# Patient Record
Sex: FEMALE | Race: OTHER | Employment: UNEMPLOYED | URBAN - METROPOLITAN AREA
[De-identification: names, ages, dates, MRNs, and addresses within clinical notes are randomized per-mention and may not be internally consistent; named-entity substitution may affect disease eponyms.]

---

## 2020-01-06 LAB — HBSAG, EXTERNAL: NEGATIVE

## 2020-12-23 ENCOUNTER — VIRTUAL VISIT (OUTPATIENT)
Dept: FAMILY MEDICINE CLINIC | Age: 35
End: 2020-12-23
Payer: SUBSIDIZED

## 2020-12-23 DIAGNOSIS — N92.6 MISSED MENSES: Primary | ICD-10-CM

## 2020-12-23 PROCEDURE — 99443 PR PHYS/QHP TELEPHONE EVALUATION 21-30 MIN: CPT | Performed by: STUDENT IN AN ORGANIZED HEALTH CARE EDUCATION/TRAINING PROGRAM

## 2020-12-23 NOTE — PROGRESS NOTES
Nate Delacruz  38 y.o. female  1985  [address] [place] [address], [address]  [id number]    [phone] (home)      [address]:    Telephone Encounter  Alfie Qu[place]       Encounter Date: 12/23/2020 at 9:33 AM    Consent: Nate Delacruz, who was seen by synchronous (real-time) audio only technology, and/or her healthcare decision maker, is aware that this patient-initiated, Telehealth encounter on 12/23/2020 is a billable service, with coverage as determined by her insurance carrier. She is aware that she may receive a bill and has provided verbal consent to proceed: Yes. Chief Complaint   Patient presents with    Missed Menses     home preg test positive       History of Present Illness   Nate Delacruz is a 38 y.o. female was evaluated by telephone. I communicated with the patient and/or health care decision maker about Missed Menses. Interpretor #[id number] used     Pt is [id number] with LMP approximately 7/15/2020 making her 23w0d with LAURE 4/21/2021. No prenatal care. States she took a home pregnancy test in August 2020 that was positive. She has been taking prenatal vitamins. States she has not been able to schedule an appointment due to insurance reasons and did not find out about our clinic until 1 month ago. Of note: Pt states that prior to knowing she was pregnant 1 month ago she was taking an over the counter dietary supplement to loose weight. She lost about 30lb but stopped the supplement as soon as she found out she was pregnant. She does not know what her pre- pregnancy weight was and does not know how much she weighs right now. LOF: no   Vaginal bleeding: no   Contractions: no   Fetal movement: yes     Pap smear: last pap 2017- + h/o abnormal pap smear  STD: never  Post partum depression: no   Genetic disorder: no  GDM history: no   HTN history or PreE: no   Thyroid history: no     COVID screening: NEGATIVE. Pt denies sick contacts or recent travel. Denies recent illness, fever, chills, sore throat, vision changes, HA, dizziness, cough , SOB, chest pain, dysuria, n/v/d, abdominal pain or extremity edema. Review of Systems   Review of Systems   Constitutional: Negative for chills and fever. HENT: Negative for congestion and sinus pain. Eyes: Negative for double vision. Respiratory: Negative for cough and shortness of breath. Cardiovascular: Negative for chest pain and palpitations. Gastrointestinal: Negative for abdominal pain, nausea and vomiting. Genitourinary: Negative for dysuria and hematuria. Musculoskeletal: Negative for myalgias. Skin: Negative for rash. Neurological: Negative for dizziness and headaches. Vitals/Objective:   General: Patient speaking in complete sentences without effort. Normal speech and cooperative. Due to this being a Virtual Check-in/Telephone evaluation, many elements of the physical examination are unable to be assessed. Assessment and Plan:   Time-based coding, delete if not needed: I spent at least 20 minutes with this new patient, and >50% of the time was spent counseling and/or coordinating care regarding Missed Menses   Total Time: minutes: 21-30 minutes    1. Missed menses  -LMP 7/15/20- [id number] at 23w0d based on LMP. -late to care: needs UDS and IOB labs next week. (if 24wk at this time needs GTT)     -h/o Abnormal pap: needs repeat pap   -AMA: requests genetic testing. No h/o PreE that I am aware of. No weigt on file to determine BMI but if Pt is determined obese at [address] would start ASA  -needs anatomy scan with MFM arranged     We discussed the expected course, resolution and complications of the diagnosis(es) in detail. Medication risks, benefits, costs, interactions, and alternatives were discussed as indicated. I advised her to contact the office if her condition worsens, changes or fails to improve as anticipated. She expressed understanding with the diagnosis(es) and plan. Patient understands that this encounter was a temporary measure, and the importance of further follow up and examination was emphasized. Patient verbalized understanding. Patient informed to follow up: 1 week for IOB visit. (message sent to nurse to schedule)     I affirm this is a Patient Initiated Episode with an Established Patient who has not had a related appointment within my department in the past 7 days or scheduled within the next 24 hours. Note: not billable if this call serves to triage the patient into an appointment for the relevant concern      Electronically Signed: Saroj Webb DO  Providers location when delivering service: home           ICD-10-CM ICD-9-CM    1. Missed menses  N92.6 626.4        Pursuant to the emergency declaration under the [address] [place] [address], [facility] waiver authority and the [employer] Act, this Virtual  Visit was conducted, with patient's consent, to reduce the patient's risk of exposure to COVID-19 and provide continuity of care for an established patient. History   Patients past medical, surgical and family histories were personally reviewed and updated. Past Medical History:   Diagnosis Date    History of abnormal cervical Pap smear 2017     History reviewed. No pertinent surgical history. No family history on file.   Social History     Socioeconomic History    Marital status: SINGLE     Spouse name: Not on file    Number of children: Not on file    Years of education: Not on file    Highest education level: Not on file   Occupational History    Not on file   Social Needs    Financial resource strain: Not on file    Food insecurity     Worry: Not on file     Inability: Not on file    Transportation needs     Medical: Not on file     Non-medical: Not on file   Tobacco Use    Smoking status: Never Smoker    Smokeless tobacco: Never Used   Substance and Sexual Activity    Alcohol use: Never     Frequency: Never     Binge frequency: Never    Drug use: Never    Sexual activity: Not on file   Lifestyle    Physical activity     Days per week: Not on file     Minutes per session: Not on file    Stress: Not on file   Relationships    Social connections     Talks on phone: Not on file     Gets together: Not on file     Attends Buddhism service: Not on file     Active member of club or organization: Not on file     Attends meetings of clubs or organizations: Not on file     Relationship status: Not on file    Intimate partner violence     Fear of current or ex partner: Not on file     Emotionally abused: Not on file     Physically abused: Not on file     Forced sexual activity: Not on file   Other Topics Concern    Not on file   Social History Narrative    Patient used to work in a [employer] [employer] [facility] [employer]             Current Medications/Allergies   Medications and Allergies reviewed:      Not on [employer]

## 2020-12-23 NOTE — PROGRESS NOTES
[address] Medicine Residency Attending Addendum:  Dr. John Tejeda, DO,  the patient and I were not physically present during this encounter. The resident and I are concurrently monitoring the patient care through appropriate telecommunication technology. I discussed the findings, assessment and plan with the resident and agree with the resident's findings and plan as documented in the resident's note.       Sidonie Goodell, MD

## 2020-12-23 NOTE — PATIENT INSTRUCTIONS
Semanas 22 a 26 de lara embarazo: Instrucciones de cuidado Weeks 22 to 26 of Your Pregnancy: Care Instructions Instrucciones de cuidado Al comenzar el 7.º mes de lara embarazo en la semana 32, los pulmones de lara bebé se están volviendo más daniel y se están preparando para respirar. Podría notar que lara bebé responde al bernardo de lara voz o la de lara roxanna. También podría notar que lara bebé se voltea y [place] menos de posición, y se retuerce o sacude más. Por lo general, las sacudidas indican que lara bebé tiene hipo. Tener hipo es totalmente normal y dura poco tiempo. Vikas vez sea buena idea pensar en asistir a debby clase de preparación para el parto. Moshe es también un buen momento para comenzar a pensar si desea que stephie el parto le administren un analgésico (medicamento para el dolor). A la mayoría de las mujeres embarazadas les hacen pruebas para la diabetes gestacional entre las semanas 24 y 29. La diabetes gestacional ocurre cuando el nivel de azúcar en la maria dolores es muy alto stephie el [facility]. La prueba es importante, porque usted puede tener diabetes gestacional y no saberlo. Amna esta enfermedad puede causarle problemas a lara bebé. La atención de seguimiento es debby parte clave de lara tratamiento y seguridad. Asegúrese de hacer y acudir a todas las citas, y llame a lara médico si está teniendo problemas. También es debby buena idea saber los resultados de madiha exámenes y mantener debby lista de los medicamentos que ady. Cómo puede cuidarse en el hogar? Alivie las molestias de las patadas de lara bebé · Cambie de posición. A veces, moshe cambio hace que lara bebé también cambie de posición. · Respire hondo al mismo tiempo que levanta los brazos sobre lara [place]. Después exhale a medida que baja los brazos. Amilcar los ejercicios de [place] para evitar pérdidas de [facility] · Puede hacer los ejercicios de [place] estando felix o sentada. ? Apriete los mismos músculos que usaría para detener el chorro de [place]. El abdomen y los muslos no deben moverse. ? Manténgalos apretados stephie 3 segundos y, luego, relájelos otros 3 segundos. ? Empiece con 3 segundos. Ashlyn Peat, añada 1 brenda cada semana hasta que sea capaz de apretar stephie 10 segundos. ? Repita el ejercicio entre 10 y 13 veces [address] Amilcar jarrell o más sesiones cada día. Alivie o reduzca la hinchazón de los pies, los tobillos, las kris y los dedos · Si tiene los dedos hinchados, quítese los [place]. · No coma alimentos con mucha sal, bladimir dennis fritas. · Coloque madiha pies sobre un banco o un sofá todo el tiempo que le sea posible. Duerma con almohadas debajo de los pies. · No se quede de pie stephie mucho tiempo ni use calzado ajustado. · Use medias elásticas de soporte. Dónde puede encontrar más información en inglés? Skye How a http://www.james.com/ Escriba G264 en la búsqueda para aprender más acerca de \"Semanas 22 a 26 de lara embarazo: Instrucciones de cuidado. \" Revisado: 11 febrero, 2020               Versión del contenido: 12.6 © 9944-1530 Healthwise, Incorporated. Las instrucciones de cuidado fueron adaptadas bajo licencia por Good [facility] Connections (which disclaims liability or warranty for this information). Si usted tiene [place] afección médica o sobre estas instrucciones, siempre pregunte a lara profesional de kiran. Healthwise, Incorporated niega toda garantía o responsabilidad por lara uso de esta información. Precauciones en el embarazo: Instrucciones de cuidado Pregnancy Precautions: Care Instructions Instrucciones de cuidado No hay debby manera samaniego de prevenir el trabajo de parto antes de la fecha esperada (trabajo de parto prematuro) o de prevenir la mayoría de otros problemas en el [facility]. Amna hay cosas que puede hacer para aumentar las probabilidades de tener un embarazo saludable. Vaya a madiha citas, siga los consejos de lara médico y cuídese. Coma fidelina y marlene ejercicio (si lara médico lo permite). Y asegúrese de tree abundante agua. La atención de seguimiento es debby parte clave de lara tratamiento y seguridad. Asegúrese de hacer y acudir a todas las citas, y llame a lara médico si está teniendo problemas. También es debby buena idea saber los resultados de madiha exámenes y mantener debby lista de los medicamentos que ady. Cómo puede cuidarse en el hogar? · Asegúrese de asistir a las citas prenatales. Lara médico le tomará la presión arterial en cada consulta. Lara médico también comprobará si tiene proteínas en lara orina. Tanto la presión arterial tigre bladimir la presencia de proteínas en la orina son señales de preeclampsia. Esta afección puede ser peligrosa tanto para usted bladimir para lara bebé. · [place] abundantes líquidos, suficientes para que lara orina sea de color amarillo adelina o transparente bladimir el agua. La deshidratación puede causar contracciones. Si tiene [employer], el corazón o el hígado y tiene que [place]'s líquidos, hable con lara médico antes de aumentar lara consumo. · Notifique a lara médico de inmediato si presenta cualquier síntoma de infección, tales bladimir: ? Ardor cuando orina. ? Flujo con mal olor de la vagina. ? Comezón en la vagina. ? Erica Lynne sin explicación. ? Dolor o sensibilidad inusual en el útero o la parte baja del abdomen. · Aliméntese en forma equilibrada. Incluya muchos alimentos que tien ricos en calcio y ming. ? Entre los alimentos ricos en calcio se incluyen la [place], el queso, el yogur, [place] y el brócoli. ? Entre los alimentos ricos en ming se incluyen las yue [place], los [place], las aves, los [place], los frijoles, las uvas pasas, el pan de grano integral y las verduras de hojas verdes. · No fume. Si necesita ayuda para dejar de fumar, hable con lara médico sobre programas y medicamentos para dejar de fumar. Estos pueden aumentar madiha probabilidades de dejar el hábito para siempre. · No ju alcohol ni use drogas ilegales. · Siga las instrucciones de lara médico acerca de la [place]. Lara médico le dirá cuánto ejercicio puede hacer. · Pregúntele a lara médico si puede tener [employer]. Si usted está en riesgo de tener trabajo de [place], lara médico podría pedirle que no tenga relaciones sexuales. · [place] precauciones para prevenir las caídas. Benjamin el embarazo las articulaciones están más sueltas y se tiene menos equilibrio. Los deportes tales bladimir el ciclismo, el esquí o el patinaje en línea pueden aumentar el riesgo de caídas. Y no monte a lizbeth, sandy en motocicleta, marlene clavados, marlene esquí acuático, bucee, ni salte en paracaídas mientras está embarazada. · Evite calentarse demasiado. No use saunas ni bañeras de hidromasaje. Evite la exposición al sol en climas calientes por mucho tiempo. [place] acetaminofén (Tylenol) para bajar debby fiebre tigre. · No tome medicamentos de venta leyla, productos herbarios ni suplementos sin hablar oh con lara médico o farmacéutico. 

Cuándo debe pedir ayuda? Llame al 911 en cualquier momento que considere que necesita atención de [place]. Por ejemplo, llame si: 
  · Se desmayó (perdió el conocimiento).  
  · Tiene convulsiones.  
  · Tiene sangrado vaginal intenso.  
  · Tiene dolor intenso en el abdomen o la pelvis.   · Le sale abundante líquido o gotea de la vagina y sabe o nila que el cordón umbilical se está saliendo a lara vagina. Si esto sucede, arrodíllese de inmediato, de oswald forma que madiha nalgas estén más altas que lara jasmin. Index disminuirá la presión sobre el cordón umbilical hasta que llegue la [facility]. Llame a lara médico ahora mismo o busque atención médica inmediata si: 
  · Tiene señales de preeclampsia, bladimir: 
? Hinchazón repentina de la d[place]on, las kris o los pies. ? Nuevos problemas de visión (bladimir oscurecimiento, bartolo borroso o bartolo puntos). ? Dolor de jasmin intenso.  
  · Tiene cualquier sangrado vaginal.  
  · Tiene dolor o cólicos abdominales.  
  · Tiene fiebre.  
  · Charolett Yanaiel tenido contracciones regulares (con o sin dolor) por [place]. Index significa que tiene 8 o más contracciones en 1 hora o que tiene 4 contracciones o más en 20 minutos después de [place] de posición y tree líquidos.  
  · Tiene debby pérdida repentina de líquido por la vagina.  
  · Tiene dolor en la parte baja de la espalda o presión en la pelvis que no desaparece.  
  · Nota que lara bebé ha dejado de moverse o se mueve mucho menos de lo normal.  
Preste especial atención a los cambios en lara kiran y asegúrese de comunicarse con lara médico si tiene algún problema. Dónde puede encontrar más información en inglés? Effie Schmid a http://www.erika.com/ Jennyfer [place]una N392 en la búsqueda para aprender más acerca de \"Precauciones en el embarazo: Instrucciones de cuidado. \" Revisado: 11 febrero, 2020               Versión del contenido: 12.6 © 5559-5335 Healthwise, Incorporated. Las instrucciones de cuidado fueron adaptadas bajo licencia por Good [facility] Connections (which disclaims liability or warranty for this information). Si usted tiene [place] afección médica o sobre estas instrucciones, siempre pregunte a lara profesional de kiran. [facility] Incorporated niega toda garantía o responsabilidad por lara uso de esta información.

## 2020-12-23 NOTE — Clinical Note
Jam Hernández,     This patient neds a new OB appt in clinic next week as she is late to care at 23w0d based on LMP 7/15/20.

## 2021-01-06 ENCOUNTER — INITIAL PRENATAL (OUTPATIENT)
Dept: FAMILY MEDICINE CLINIC | Age: 36
End: 2021-01-06
Payer: SUBSIDIZED

## 2021-01-06 ENCOUNTER — HOSPITAL ENCOUNTER (OUTPATIENT)
Dept: LAB | Age: 36
Discharge: HOME | End: 2021-01-06
Payer: SUBSIDIZED

## 2021-01-06 VITALS
WEIGHT: 215 LBS | HEART RATE: 101 BPM | OXYGEN SATURATION: 98 % | TEMPERATURE: 97 F | SYSTOLIC BLOOD PRESSURE: 105 MMHG | RESPIRATION RATE: 16 BRPM | HEIGHT: 62 IN | DIASTOLIC BLOOD PRESSURE: 72 MMHG | BODY MASS INDEX: 39.56 KG/M2

## 2021-01-06 DIAGNOSIS — O09.529 ANTEPARTUM MULTIGRAVIDA OF ADVANCED MATERNAL AGE: ICD-10-CM

## 2021-01-06 DIAGNOSIS — Z34.91 INITIAL OBSTETRIC VISIT IN FIRST TRIMESTER: ICD-10-CM

## 2021-01-06 DIAGNOSIS — Z34.91 INITIAL OBSTETRIC VISIT IN FIRST TRIMESTER: Primary | ICD-10-CM

## 2021-01-06 DIAGNOSIS — E66.9 OBESITY WITHOUT SERIOUS COMORBIDITY, UNSPECIFIED CLASSIFICATION, UNSPECIFIED OBESITY TYPE: ICD-10-CM

## 2021-01-06 LAB
ANTIBODY SCREEN, EXTERNAL: NEGATIVE
BILIRUB UR QL STRIP: NEGATIVE
CHLAMYDIA, EXTERNAL: NEGATIVE
GLUCOSE UR-MCNC: NEGATIVE MG/DL
HCG URINE, QL. (POC): POSITIVE
HIV, EXTERNAL: NORMAL
KETONES P FAST UR STRIP-MCNC: NEGATIVE MG/DL
N. GONORRHEA, EXTERNAL: NEGATIVE
PH UR STRIP: 5.5 [PH] (ref 4.6–8)
PROT UR QL STRIP: NORMAL
RPR, EXTERNAL: NORMAL
RUBELLA, EXTERNAL: NORMAL
SP GR UR STRIP: 1.03 (ref 1–1.03)
TYPE, ABO & RH, EXTERNAL: NORMAL
UA UROBILINOGEN AMB POC: NORMAL (ref 0.2–1)
URINALYSIS CLARITY POC: NORMAL
URINALYSIS COLOR POC: NORMAL
URINALYSIS, EXTERNAL: NORMAL
URINE BLOOD POC: NORMAL
URINE LEUKOCYTES POC: NORMAL
URINE NITRITES POC: NEGATIVE
VALID INTERNAL CONTROL?: YES

## 2021-01-06 PROCEDURE — 88175 CYTOPATH C/V AUTO FLUID REDO: CPT

## 2021-01-06 PROCEDURE — 0502F SUBSEQUENT PRENATAL CARE: CPT | Performed by: STUDENT IN AN ORGANIZED HEALTH CARE EDUCATION/TRAINING PROGRAM

## 2021-01-06 PROCEDURE — 87591 N.GONORRHOEAE DNA AMP PROB: CPT

## 2021-01-06 RX ORDER — ASPIRIN 81 MG/1
81 TABLET ORAL DAILY
Qty: 30 TAB | Refills: 0 | Status: SHIPPED | OUTPATIENT
Start: 2021-01-06 | End: 2021-01-30

## 2021-01-06 NOTE — PATIENT INSTRUCTIONS
[facility] Learning About Pregnancy Instrucciones de cuidado [place] kiran stephie las primeras semanas del [facility] es de particular importancia para la kiran de burks bebé. Cuídese. Cualquier cosa que perjudique burks organismo puede perjudicar a burks bebé. Asegúrese de asistir a todas madiha citas médicas. Los chequeos médicos regulares les ayudarán a usted y a burks bebé a mantenerse saludables. La atención de seguimiento es debby parte clave de burks tratamiento y seguridad. Asegúrese de hacer y acudir a todas las citas, y llame a bursk médico si está teniendo problemas. También es debby buena idea saber los resultados de madiha exámenes y mantener debby lista de los medicamentos que ady. Cómo puede cuidarse en el hogar? Alimentación 
  · Siga debby [employer]. Asegúrese de incluir en burks dieta abundantes frijoles (habichuelas), arvejas (chícharos) y verduras de hojas verdes.  
  · No se salte las comidas ni pase muchas horas sin comer. Si tiene náuseas, trate de comer un refrigerio pequeño y saludable cada 2 o 3 horas.  
  · No consuma pescados que tengan [address] de chris, bladimir tiburón, pez jasbir o caballa. No coma más de debby karin de atún a la semana.  
  · Ju abundantes líquidos, suficientes para que burks orina sea de color amarillo adelina o transparente bladimir el agua. Si tiene [employer], el corazón o el hígado y tiene que [place]'s líquidos, hable con burks médico antes de aumentar burks consumo.  
  · Reduzca la cafeína, bladimir el café, el té y las bebidas de cola.  
  · No ju alcohol, bladimir cerveza, vino o licor rick.  
  · [place] un multivitamínico que contenga al menos 400 microgramos (mcg) de ácido fólico para ayudar a prevenir las anomalías congénitas (de nacimiento).  El cereal enriquecido y el astorga integral son buenas simon adicionales de ácido fólico.  
   · Aumente el calcio en lara Lyndle Nura. Trate de beber un cuarto de galón de Parse. También podría tree suplementos de calcio y elegir alimentos bladimir el queso y el yogur. Estilo de violeta 
  · Asegúrese de asistir a las citas de seguimiento.  
  · Descanse lo suficiente. Mientras esté embarazada podría sentirse más cansada de lo normal.  
  · Amilcar por lo menos 30 minutos de ejercicio la mayoría de los días de la [place]. Caminar es debby buena opción. Si no ha hecho ejercicio en el pasado, comience poco a poco. Amilcar varias caminatas cortas todos los días.  
  · No fume. Si necesita ayuda para dejar de fumar, hable con lara médico sobre programas para dejar de fumar. Estos pueden aumentar madiha probabilidades de dejar el hábito para siempre.  
  · No toque heces de chirag ni lara caja de excrementos. Además, lávese las kris luego de manipular carne cruda y cocine fidelina toda la carne antes de comerla. Use guantes cuando trabaje en el jardín y [employer] kris cuando termine. Las heces de [place], la carne cruda o poco cocida, y la raheem contaminada pueden causar infecciones que podrían perjudicar a lara bebé o conducir a un aborto espontáneo.  
  · No use bañeras de hidromasaje ni saunas. Elevar la temperatura corporal podría perjudicar a lara bebé.   · Evite los gases de las sustancias químicas y la pintura, o los venenos.  
  · No use drogas ilegales ni ju alcohol. Medicamentos 
  · Revise todos los medicamentos que esté tomando con lara Vearl Kim sea necesario cambiar algunos de madiha medicamentos de rutina para proteger al bebé.   · [place] acetaminofén (Tylenol) para [employer], bladimir dolor de Sally o de espalda leves o fiebre leve con síntomas de resfriado. No utilice medicamentos antiinflamatorios no esteroideos (ABBY), tales bladimir ibuprofeno (Advil, Motrin) o naproxeno (Aleve), a menos que lara médico lo autorice.   · No tome dos o más analgésicos (medicamentos para el dolor) al [employer] tiempo a menos que el médico se lo haya indicado. Muchos analgésicos contienen acetaminofén, es decir, Tylenol. El exceso de acetaminofén (Tylenol) puede ser dañino.  
  · [place] los medicamentos exactamente bladimir le fueron recetados. Llame a lara médico si nila estar teniendo problemas con lara medicamento. [address] náuseas del embarazo 
  · Si siente náuseas al levantarse, pruebe a tree un refrigerio pequeño (bladimir galletas saladas) antes de salir de la cama. Dese algún tiempo para digerir el refrigerio y salga de la cama lentamente.  
  · No se salte las comidas ni pase mucho tiempo sin comer. Un estómago vacío puede empeorar las náuseas.  
  · Consuma comidas pequeñas y frecuentes en lugar de jarrell comidas abundantes al día.  
  · [place] abundantes líquidos. Las bebidas deportivas, bladimir Gatorade o Powerade, son buenas opciones.  
  · Consuma alimentos ricos en proteínas zeb bajos en grasas.  
  · Si está tomando suplementos de ming, pregúntele a lara médico si son necesarios. El ming puede empeorar las náuseas.  
  · Evite cualquier [employer] le produzca náuseas, bladimir el del café.  
  · Descanse mucho. Las náuseas del embarazo podrían empeorar si está cansada. Dónde puede encontrar más información en inglés? Meg Serrato a http://www.erika.com/ Mily Ken T479 en la búsqueda para aprender más acerca de \"Aprenda sobre el [facility]. \" Revisado: 11 febrero, 2020               Versión del contenido: 12.6 © 9974-5366 Healthwise, Incorporated. Las instrucciones de cuidado fueron adaptadas bajo licencia por Good [facility] Connections (which disclaims liability or warranty for this information). Si usted tiene [place] afección médica o sobre estas instrucciones, siempre pregunte a lara profesional de kiran. [employer]orporated niega toda garantía o responsabilidad por lara uso de esta información.

## 2021-01-06 NOTE — PROGRESS NOTES
Chief Complaint   Patient presents with    Initial Prenatal Visit     1. Have you been to the ER, urgent care clinic since your last visit? Hospitalized since your last visit? N/A    2. Have you seen or consulted any other health care providers outside of the [address] [place] [address] since your last visit? Include any pap smears or colon screening.  N/A

## 2021-01-06 NOTE — PROGRESS NOTES
I reviewed with the resident the medical history and the resident's findings on the physical examination. I discussed with the resident the patient's diagnosis and concur with the plan. Unplanned pregnancy, FOB involved   All previous SVDs, all term, all uncomplicated   FH 44AQ, FHT 150s     42yo [id number] @ unknown [place], as she does not know when LMP was (thinks sometime in July)  1. IUP: IOB labs today, scheduling MFM scan   2. Late to care: will defer dating to MFM, check UDS   3. Obesity: counseled on appropriate weight gain  4.   AMA: desires genetic screening but medicaid pending so will await until approved

## 2021-01-06 NOTE — PROGRESS NOTES
Subjective:   Aruna Stark is a 38 y.o.  who is being seen today for her first obstetrical visit. Late to prenatal care d/t no insurance. Has applied for Medicaid. She is at Unknown gestation by unknown LMP  First pos pregnancy test: 2020  This is not a planned pregnancy. FOB is involved. See flow sheet for OB history. History of GDM or DM? None  History of GHTN or CHTN? None  History of pre-eclampsia? None  Taking prenatal vitamins? Yes    Allergies- reviewed:   No Known Allergies    Medications- reviewed:   Current Outpatient Medications   Medication Sig    aspirin delayed-release 81 mg tablet Take 1 Tab by mouth daily.  PNV with Ca No.65-Iron Poly-FA 60 mg iron-1 mg cap Take  by mouth. No current facility-administered medications for this visit. Past Medical History- reviewed:  Past Medical History:   Diagnosis Date    Abnormal Papanicolaou smear of cervix     History of abnormal cervical Pap smear        Past Surgical History- reviewed:   History reviewed. No pertinent surgical history.       Objective:     Visit Vitals  /72   Pulse (!) 101   Temp 97 °F (36.1 °C) (Temporal)   Resp 16   Ht 5' 1.5\" (1.562 m)   Wt 215 lb (97.5 kg)   LMP 07/15/2020 (Approximate)   SpO2 98%   BMI 39.97 kg/m²       See physical exam on flowsheet  Pelvic exam chaperoned by Emil Chavez LPN    Labs:  Recent Results (from the past 12 hour(s))   AMB POC URINALYSIS DIP STICK AUTO W/O MICRO    Collection Time: 21 10:25 AM   Result Value Ref Range    Color (UA POC) Dark Yellow     Clarity (UA POC) Slightly Cloudy     Glucose (UA POC) Negative Negative    Bilirubin (UA POC) Negative Negative    Ketones (UA POC) Negative Negative    Specific gravity (UA POC) 1.030 1.001 - 1.035    Blood (UA POC) 1+ Negative    pH (UA POC) 5.5 4.6 - 8.0    Protein (UA POC) Trace Negative    Urobilinogen (UA POC) 0.2 mg/dL 0.2 - 1    Nitrites (UA POC) Negative Negative    Leukocyte esterase (UA POC) Trace Negative   AMB POC URINE PREGNANCY TEST, VISUAL COLOR COMPARISON    Collection Time: 21 10:25 AM   Result Value Ref Range    VALID INTERNAL CONTROL POC Yes     HCG urine, Ql. (POC) Positive Negative         Assessment and Plan:         ICD-10-CM ICD-9-CM    1. Initial obstetric visit in first trimester  Z34.91 V22.1 CBC W/O DIFF      TYPE & SCREEN      RUBELLA AB, IGG      HEP B SURFACE AG      RPR      HIV 1/2 AG/AB, 4TH GENERATION,W RFLX CONFIRM      HEMOGLOBIN FRACTIONATION      CULTURE, URINE      DRUG SCREEN, URINE      PAP IG, CT-NG, RFX APTIMA HPV ASCUS ([id number], [id number])      AMB POC URINALYSIS DIP STICK AUTO W/O MICRO      AMB POC URINE PREGNANCY TEST, VISUAL COLOR COMPARISON      HEMOGLOBIN FRACTIONATION      HIV 1/2 AG/AB, 4TH GENERATION,W RFLX CONFIRM      RPR      HEP B SURFACE AG      RUBELLA AB, IGG      TYPE & SCREEN      CBC W/O DIFF      PAP IG, CT-NG, RFX APTIMA HPV ASCUS ([id number], [id number])      CANCELED: CHLAMYDIA/GC PCR      CANCELED: PAP IG, APTIMA HPV AND RFX 16/18,45 ([id number])      CANCELED: URINALYSIS W/ RFLX MICROSCOPIC   2. Antepartum multigravida of advanced maternal age  O12.46 65.56 aspirin delayed-release 81 mg tablet   3. Obesity without serious comorbidity, unspecified classification, unspecified obesity type  E66.9 278.00        38 y.o.  Unknown LAURE Not found. here for initial OB visit     · IOB labs collected (CBC without diff, blood type & screen, Rh antibody screen, rubella titer, HBsAg titer, RPR, HIV, Hemoglobin fractionation, UA w/ cx, pap smear, gonorrhea/chlamydia)  · Discussed recommended weight gain (prepreg BMI <19.8 28-40lb, 19.8-26 25-35lb, 26-29 15-25lb, >29 11-20lb), pre pregnancy weight 175lb per patient > BMI 32.5  · Daily prenatal vitamin   · Discussed optional genetic screening and pt:  Would like genetic testing, but will wait to get genetic testing after Medicaid approval.   · MFM scan scheduled  · Follow up appointment made and patient informed    Pregnancy concerns:  -Late to care: UDS collected, MFM scan for dating  -h/o Abnormal pap: repeat pap collected   -AMA: On ASA. Would like genetic testing, but will wait to get genetic testing after Medicaid approval.   -Obesity: counseled appropriate weight gain  -Anatomy scan with MFM arranged          Orders Placed This Encounter    CULTURE, URINE     Standing Status:   Future     Number of Occurrences:   1     Standing Expiration Date:   1/7/2022    CBC W/O DIFF     Standing Status:   Future     Number of Occurrences:   1     Standing Expiration Date:   1/6/2022    RUBELLA AB, IGG     Standing Status:   Future     Number of Occurrences:   1     Standing Expiration Date:   1/7/2022    HEP B SURFACE AG     Standing Status:   Future     Number of Occurrences:   1     Standing Expiration Date:   1/6/2022    RPR     Standing Status:   Future     Number of Occurrences:   1     Standing Expiration Date:   1/7/2022    HIV 1/2 AG/AB, 4TH GENERATION,W RFLX CONFIRM     Standing Status:   Future     Number of Occurrences:   1     Standing Expiration Date:   1/6/2022    HEMOGLOBIN FRACTIONATION     Standing Status:   Future     Number of Occurrences:   1     Standing Expiration Date:   1/6/2022    DRUG SCREEN, URINE     Standing Status:   Future     Number of Occurrences:   1     Standing Expiration Date:   1/6/2022    AMB POC URINALYSIS DIP STICK AUTO W/O MICRO    AMB POC URINE PREGNANCY TEST, VISUAL COLOR COMPARISON    TYPE & SCREEN     ENTER SURGERY DATE IF FOR PRE-OP TESTING. Standing Status:   Future     Number of Occurrences:   1     Standing Expiration Date:   1/6/2022     Order Specific Question:   Has patient been transfused or pregnant in the last 3 mos. ? Answer:   Unknown    aspirin delayed-release 81 mg tablet     Sig: Take 1 Tab by mouth daily.      Dispense:  30 Tab     Refill:  0    PAP IG, CT-NG, RFX APTIMA HPV ASCUS ([id number], [id number])     Standing Status:   Future     Number of Occurrences:   1     Standing Expiration Date:   1/6/2022     Order Specific Question:   Pap Source? Answer:   Cervical     Order Specific Question:   Total Hysterectomy? Answer:   No     Order Specific Question:   Supracervical Hysterectomy? Answer:   No     Order Specific Question:   Post Menopausal?     Answer:   No     Order Specific Question:   Hormone Therapy? Answer:   No     Order Specific Question:   IUD? Answer:   No     Order Specific Question:   Abnormal Bleeding? Answer:   No     Order Specific Question:   Pregnant     Answer:   Yes     Order Specific Question:   Post Partum? Answer:   No         I have discussed the diagnosis with the patient and the intended plan as seen in the above orders. The patient has received an after-visit summary and questions were answered concerning future plans. I have discussed medication side effects and warnings with the patient as well. Informed pt to return to the office or go to the ER if she experiences vaginal bleeding, vaginal discharge, leaking of fluid, pelvic cramping.       Pt seen and discussed with Lenard (attending physician)    Genoveva Harden MD  Family Medicine Resident

## 2021-01-07 LAB
ABO + RH BLD: NORMAL
AMORPH CRY URNS QL MICRO: ABNORMAL
AMPHET UR QL SCN: NEGATIVE
APPEARANCE UR: CLEAR
BACTERIA URNS QL MICRO: ABNORMAL /HPF
BARBITURATES UR QL SCN: NEGATIVE
BENZODIAZ UR QL: NEGATIVE
BILIRUB UR QL: NEGATIVE
BLOOD BANK CMNT PATIENT-IMP: NORMAL
BLOOD GROUP ANTIBODIES SERPL: NORMAL
CANNABINOIDS UR QL SCN: NEGATIVE
COCAINE UR QL SCN: NEGATIVE
COLOR UR: ABNORMAL
DRUG SCRN COMMENT,DRGCM: NORMAL
EPITH CASTS URNS QL MICRO: ABNORMAL /LPF
GLUCOSE UR STRIP.AUTO-MCNC: NEGATIVE MG/DL
HGB UR QL STRIP: ABNORMAL
KETONES UR QL STRIP.AUTO: NEGATIVE MG/DL
LEUKOCYTE ESTERASE UR QL STRIP.AUTO: NEGATIVE
METHADONE UR QL: NEGATIVE
NITRITE UR QL STRIP.AUTO: POSITIVE
OPIATES UR QL: NEGATIVE
PCP UR QL: NEGATIVE
PH UR STRIP: 5.5 [PH] (ref 5–8)
PROT UR STRIP-MCNC: NEGATIVE MG/DL
RBC #/AREA URNS HPF: ABNORMAL /HPF (ref 0–5)
SP GR UR REFRACTOMETRY: 1.03 (ref 1–1.03)
SPECIMEN EXP DATE BLD: NORMAL
UROBILINOGEN UR QL STRIP.AUTO: 0.2 EU/DL (ref 0.2–1)
WBC URNS QL MICRO: ABNORMAL /HPF (ref 0–4)

## 2021-01-08 ENCOUNTER — TELEPHONE (OUTPATIENT)
Dept: FAMILY MEDICINE CLINIC | Age: 36
End: 2021-01-08

## 2021-01-08 DIAGNOSIS — B96.89 BV (BACTERIAL VAGINOSIS): ICD-10-CM

## 2021-01-08 DIAGNOSIS — Z34.91 INITIAL OBSTETRIC VISIT IN FIRST TRIMESTER: Primary | ICD-10-CM

## 2021-01-08 DIAGNOSIS — B95.1 POSITIVE GBS TEST: Primary | ICD-10-CM

## 2021-01-08 DIAGNOSIS — N76.0 BV (BACTERIAL VAGINOSIS): ICD-10-CM

## 2021-01-08 LAB
BACTERIA SPEC CULT: ABNORMAL
BACTERIA SPEC CULT: ABNORMAL
CC UR VC: ABNORMAL
SERVICE CMNT-IMP: ABNORMAL

## 2021-01-08 RX ORDER — AMOXICILLIN 875 MG/1
875 TABLET, FILM COATED ORAL 2 TIMES DAILY
Qty: 10 TAB | Refills: 0 | Status: SHIPPED | OUTPATIENT
Start: 2021-01-08 | End: 2021-01-13

## 2021-01-08 RX ORDER — METRONIDAZOLE 7.5 MG/G
1 GEL VAGINAL
Qty: 25 G | Refills: 0 | Status: SHIPPED | OUTPATIENT
Start: 2021-01-08 | End: 2021-01-13

## 2021-01-08 NOTE — TELEPHONE ENCOUNTER
Called patient to inform her of Pap smear and urine culture results. Explained that Pap was concerning for bacterial vaginosis and need for treatment. Also discussed culture results and need for treatment. Answered all questions and patient was agreeable to plan. Patient will  antibiotics from pharmacy. Used  #[id number] during the encounter.     Catarino Severin, MD

## 2021-01-08 NOTE — PROGRESS NOTES
Pap concerning for BV and Candida, neg for intraepi lesion/malignancy. Sent prescription for metronidazole intravaginal gel. Urine culture grew GBS >100,000 colonies. Sent prescription for amoxicillin. WBC elevated likely 2/2 infections.      PNL: AB pos Ab neg, G/C pend, VZV needs collected, Rubella nonimmune,  RPR nr, HepB/HIV neg

## 2021-01-11 LAB
C TRACH RRNA SPEC QL NAA+PROBE: NEGATIVE
DEPRECATED HGB OTHER BLD-IMP: 0 %
ERYTHROCYTE [DISTWIDTH] IN BLOOD BY AUTOMATED COUNT: 12.2 % (ref 11.5–14.5)
HBV SURFACE AG SER QL: <0.1 INDEX
HBV SURFACE AG SER QL: NEGATIVE
HCT VFR BLD AUTO: 40.7 % (ref 35–47)
HGB A MFR BLD: 98 % (ref 96.4–98.8)
HGB A2 MFR BLD COLUMN CHROM: 2 % (ref 1.8–3.2)
HGB BLD-MCNC: 13.5 G/DL (ref 11.5–16)
HGB C MFR BLD: 0 %
HGB F MFR BLD: 0 % (ref 0–2)
HGB FRACT BLD-IMP: NORMAL
HGB S BLD QL SOLY: NEGATIVE
HGB S MFR BLD: 0 %
HIV 1+2 AB+HIV1 P24 AG SERPL QL IA: NONREACTIVE
HIV12 RESULT COMMENT, HHIVC: NORMAL
MCH RBC QN AUTO: 31.3 PG (ref 26–34)
MCHC RBC AUTO-ENTMCNC: 33.2 G/DL (ref 30–36.5)
MCV RBC AUTO: 94.4 FL (ref 80–99)
N GONORRHOEA RRNA SPEC QL NAA+PROBE: NEGATIVE
NRBC # BLD: 0 K/UL (ref 0–0.01)
NRBC BLD-RTO: 0 PER 100 WBC
PLATELET # BLD AUTO: 192 K/UL (ref 150–400)
PMV BLD AUTO: 11.6 FL (ref 8.9–12.9)
RBC # BLD AUTO: 4.31 M/UL (ref 3.8–5.2)
RPR SER QL: NONREACTIVE
RUBV IGG SER-IMP: NONREACTIVE
RUBV IGG SERPL IA-ACNC: 4.6 IU/ML
SPECIMEN SOURCE: NORMAL
WBC # BLD AUTO: 12.9 K/UL (ref 3.6–11)

## 2021-01-12 NOTE — PROGRESS NOTES
G/C neg.      PNL: AB pos Ab neg, G/C neg, VZV add on ordered, Rubella nonimmune,  RPR nr, HepB/HIV neg

## 2021-01-14 ENCOUNTER — DOCUMENTATION ONLY (OUTPATIENT)
Dept: FAMILY MEDICINE CLINIC | Age: 36
End: 2021-01-14

## 2021-01-14 DIAGNOSIS — R82.71 GBS BACTERIURIA: ICD-10-CM

## 2021-01-15 ENCOUNTER — HOSPITAL ENCOUNTER (OUTPATIENT)
Dept: PERINATAL CARE | Age: 36
Discharge: HOME | End: 2021-01-15
Attending: OBSTETRICS & GYNECOLOGY
Payer: SUBSIDIZED

## 2021-01-15 PROCEDURE — 76811 OB US DETAILED SNGL FETUS: CPT | Performed by: OBSTETRICS & GYNECOLOGY

## 2021-01-19 NOTE — PROGRESS NOTES
Return OB Visit     Dinesh ervin interpretor used   Subjective:   Gregg De Leon 38 y.o. Lanie Irene  LAURE: 3/29/2021, by Last Menstrual Period  GA:  30w2d. LOF: no  Vaginal bleeding: no  Fetal movement (after 20 weeks): yes  Contractions: no    Pt denies fever, chills, HA, vision disturbances, RUQ pain, chest pain, SOB, N/V/D, constipation, urinary problems, foul smelling vaginal discharge, LE Edema worse than usual.     OB History    Para Term  AB Living   4 3 3     3   SAB TAB Ectopic Molar Multiple Live Births             3      # Outcome Date GA Lbr Luc/2nd Weight Sex Delivery Anes PTL Lv   4 Current            3 Term 06/07/15 37w0d  8 lb (3.629 kg) F Vag-Spont   ANSON   2 Term 12 37w0d  8 lb (3.629 kg) M Vag-Spont   ANSON   1 Term 03 38w0d  7 lb (3.175 kg) M Vag-Spont   ANSON      Obstetric Comments   First 3 babies all [facility] in [facility]        Allergies- reviewed:   No Known Allergies  Medications- reviewed:   Current Outpatient Medications   Medication Sig    aspirin delayed-release 81 mg tablet Take 1 Tab by mouth daily.  PNV with Ca No.65-Iron Poly-FA 60 mg iron-1 mg cap Take  by mouth. No current facility-administered medications for this visit. Past Medical History- reviewed:  Past Medical History:   Diagnosis Date    Abnormal Papanicolaou smear of cervix     History of abnormal cervical Pap smear      Past Surgical History- reviewed:   History reviewed. No pertinent surgical history.   Social History- reviewed:  Social History     Socioeconomic History    Marital status: SINGLE     Spouse name: Not on file    Number of children: Not on file    Years of education: Not on file    Highest education level: Not on file   Occupational History    Not on file   Social Needs    Financial resource strain: Not on file    Food insecurity     Worry: Not on file     Inability: Not on file    Transportation needs     Medical: Not on file     Non-medical: Not on file   Tobacco Use    Smoking status: Never Smoker    Smokeless tobacco: Never Used   Substance and Sexual Activity    Alcohol use: Never     Frequency: Never     Binge frequency: Never    Drug use: Never    Sexual activity: Yes     Partners: Male   Lifestyle    Physical activity     Days per week: Not on file     Minutes per session: Not on file    Stress: Not on file   Relationships    Social connections     Talks on phone: Not on file     Gets together: Not on file     Attends Nondenominational service: Not on file     Active member of club or organization: Not on file     Attends meetings of clubs or organizations: Not on file     Relationship status: Not on file    Intimate partner violence     Fear of current or ex partner: Not on file     Emotionally abused: Not on file     Physically abused: Not on file     Forced sexual activity: Not on file   Other Topics Concern    Not on file   Social History Narrative    Patient used to work in a [employer]- reviewed: There is no immunization history on file for this patient.     Objective:     Visit Vitals  /67 (BP 1 Location: Left arm, BP Patient Position: Sitting)   Pulse 98   Temp 97.7 °F (36.5 °C) (Temporal)   Resp 18   Ht 5' 1.5\" (1.562 m)   Wt 222 lb (100.7 kg)   LMP 07/15/2020 (Approximate)   SpO2 100%   BMI 41.27 kg/m²       Physical Exam:  GENERAL APPEARANCE: alert, well appearing, in no apparent distress  ABDOMEN: gravid, Fundal height 42 FHT present at 145  bpm  PSYCH: normal mood and affect     Labs  Recent Results (from the past 12 hour(s))   AMB POC URINALYSIS DIP STICK AUTO W/O MICRO    Collection Time: 01/20/21  9:51 AM   Result Value Ref Range    Color (UA POC) Yellow     Clarity (UA POC) Slightly Cloudy     Glucose (UA POC) 2+ Negative    Bilirubin (UA POC) Negative Negative    Ketones (UA POC) Negative Negative    Specific gravity (UA POC) 1.030 1.001 - 1.035    Blood (UA POC) Trace Negative    pH (UA POC) 5.5 4.6 - 8.0    Protein (UA POC) Negative Negative    Urobilinogen (UA POC) 0.2 mg/dL 0.2 - 1    Nitrites (UA POC) Negative Negative    Leukocyte esterase (UA POC) Negative Negative     I personally reviewed POC UA- UA negative. No signs of infection. No evidence of proteinuria       Assessment         ICD-10-CM ICD-9-CM    1. 30 weeks gestation of pregnancy  Z3A.30 V22.2 AMB POC URINALYSIS DIP STICK AUTO W/O MICRO      GLUCOSE, GESTATIONAL 1 HR TOLERANCE      VZV AB, IGG      VZV AB, IGG      GLUCOSE, GESTATIONAL 1 HR TOLERANCE         Plan   38 y.o.  30w2d LAURE 3/29/2021, by Last Menstrual Period here for return OB visit     1. SIUP at 30w2d  -PNL:AB pos Ab neg, G/C neg, VZV needs collected, Rubella nonimmune,  RPR nr, HepB/HIV neg. Pap NIL. HgB 13.5  -Genetic testing: NIPT sent today   -Immunizations: flu and Tdap today   -Ultrasound: anatomy scan 1/15 at 29w4d- EFW 49th%,  EDC 3/29/21-extra small post-axial (\"pinky side\")- digit at least on the left side hands-(FOB and another child have extra digits per mom). · VZV collected today- missed at IOB   · tdap and flu given today   · GTT 1hr today   · NIPT sent today- follow up results   · Follow up in 2 weeks       PREGNANCY CONCERNS  Late to care: UDS neg. SLIUP with  EGA of 29w4. As her LMP based dates would place her at 26w2, we recommend using her [place]-based [address] from today of 3/29/21. Extra Digit: noted on MFM scan from 1/15. Pt states her  and daughter also have an extra digit. -f/u NIPT     h/o Abnormal pap: pap at [facility]     AMA: On ASA. Would like genetic testing, but will wait to get genetic testing after Medicaid approval.     Obesity: BMI ~40. Anatomy scan at 29w with EFW 49th%  -1hr GTT today  -may need growth scan prior to delivery     BV: noted on pap smear. Asymptomatic. Treated wit Flagyl     GBS Bacteruria: >100K on UCx from [email or web address]    -needs Abx at delivery     Rubella nonimmune- needs pp vaccination         BIRTH PLAN  · Continuity Provider: JEAN-PAUL  · Pain mgmt. in labor: undecided  · Feeding plan: undecided  · Social: Denies Tobacco, EtoH or illict drugs. Orders Placed This Encounter    GLUCOSE, GESTATIONAL 1 HR TOLERANCE     Standing Status:   Future     Number of Occurrences:   1     Standing Expiration Date:   1/20/2022    VZV AB, IGG     Standing Status:   Future     Number of Occurrences:   1     Standing Expiration Date:   1/20/2022    AMB POC URINALYSIS DIP STICK AUTO W/O MICRO     Labor precautions discussed, including: Regular painful contractions, lasting for greater than one hour, taking your breath away; any vaginal bleeding; any leakage of fluid; or absent or decreased fetal movement. Call M.D. on call if any of these symptoms or signs occur. I have discussed the diagnosis with the patient and the intended plan as seen in the above orders. The patient has received an after-visit summary and questions were answered concerning future plans. I have discussed medication side effects and warnings with the patient as well. Informed pt to return to the office or go to the ER if she experiences vaginal bleeding, vaginal discharge, leaking of fluid, pelvic cramping.     Pt seen and discussed with Dr. Иван Pena (attending physician)    Stephen Dickerson DO  Family Medicine Resident

## 2021-01-20 ENCOUNTER — ROUTINE PRENATAL (OUTPATIENT)
Dept: FAMILY MEDICINE CLINIC | Age: 36
End: 2021-01-20
Payer: SUBSIDIZED

## 2021-01-20 VITALS
WEIGHT: 222 LBS | RESPIRATION RATE: 18 BRPM | HEIGHT: 62 IN | TEMPERATURE: 97.7 F | OXYGEN SATURATION: 100 % | HEART RATE: 98 BPM | SYSTOLIC BLOOD PRESSURE: 100 MMHG | DIASTOLIC BLOOD PRESSURE: 67 MMHG | BODY MASS INDEX: 40.85 KG/M2

## 2021-01-20 DIAGNOSIS — Z23 ENCOUNTER FOR IMMUNIZATION: ICD-10-CM

## 2021-01-20 DIAGNOSIS — Z3A.30 30 WEEKS GESTATION OF PREGNANCY: Primary | ICD-10-CM

## 2021-01-20 LAB
BILIRUB UR QL STRIP: NEGATIVE
GLUCOSE UR-MCNC: NORMAL MG/DL
KETONES P FAST UR STRIP-MCNC: NEGATIVE MG/DL
PH UR STRIP: 5.5 [PH] (ref 4.6–8)
PROT UR QL STRIP: NEGATIVE
SP GR UR STRIP: 1.03 (ref 1–1.03)
UA UROBILINOGEN AMB POC: NORMAL (ref 0.2–1)
URINALYSIS CLARITY POC: NORMAL
URINALYSIS COLOR POC: YELLOW
URINE BLOOD POC: NORMAL
URINE LEUKOCYTES POC: NEGATIVE
URINE NITRITES POC: NEGATIVE

## 2021-01-20 PROCEDURE — 0502F SUBSEQUENT PRENATAL CARE: CPT | Performed by: STUDENT IN AN ORGANIZED HEALTH CARE EDUCATION/TRAINING PROGRAM

## 2021-01-20 PROCEDURE — 0502F SUBSEQUENT PRENATAL CARE: CPT

## 2021-01-20 PROCEDURE — 90472 IMMUNIZATION ADMIN EACH ADD: CPT | Performed by: STUDENT IN AN ORGANIZED HEALTH CARE EDUCATION/TRAINING PROGRAM

## 2021-01-20 PROCEDURE — 90686 IIV4 VACC NO PRSV 0.5 ML IM: CPT

## 2021-01-20 PROCEDURE — 90471 IMMUNIZATION ADMIN: CPT

## 2021-01-20 PROCEDURE — 90686 IIV4 VACC NO PRSV 0.5 ML IM: CPT | Performed by: STUDENT IN AN ORGANIZED HEALTH CARE EDUCATION/TRAINING PROGRAM

## 2021-01-20 PROCEDURE — 81003 URINALYSIS AUTO W/O SCOPE: CPT | Performed by: STUDENT IN AN ORGANIZED HEALTH CARE EDUCATION/TRAINING PROGRAM

## 2021-01-20 NOTE — PROGRESS NOTES
1. Have you been to the ER, urgent care clinic since your last visit? Hospitalized since your last visit? No    2. Have you seen or consulted any other health care providers outside of the [address] [place] [address] since your last visit? Include any pap smears or colon screening. No     Reviewed record in preparation for visit and have obtained necessary documentation.

## 2021-01-20 NOTE — PROGRESS NOTES
I reviewed with the resident the medical history and the resident's findings on the physical examination. I discussed with the resident the patient's diagnosis and concur with the plan. Unplanned pregnancy, FOB involved   All previous SVDs, all term, all uncomplicated   FH 86YN, FHT 150s     34yo  @ 30w2d by 29wk scan   1. IUP: RH pos, GTT today, do not repeat GBS testing   2. Late to care: will defer dating to MFM, check UDS   3. Obesity: counseled on appropriate weight gain, measuring large, consider additional growth scan   4. AMA: desires genetic screening but medicaid pending so will await until approved   5. Extra digit: NIPT   6. Rubella NI: offer vaccine PP   7.   GBS bacteriuria: abx at delivery     Referred to [facility]

## 2021-01-20 NOTE — PATIENT INSTRUCTIONS
Precauciones en el embarazo: Instrucciones de cuidado  Pregnancy Precautions: Care Instructions  Instrucciones de cuidado    No hay debby manera samaniego de prevenir el trabajo de parto antes de la fecha esperada (trabajo de parto prematuro) o de prevenir la mayoría de otros problemas en el embarazo. Amna hay cosas que puede hacer para aumentar las probabilidades de tener un embarazo saludable. Vaya a madiha citas, siga los consejos de lara médico y cuídese. Coma fidelina y marlene ejercicio (si lara médico lo permite). Y asegúrese de tree abundante agua.  La atención de seguimiento es debby parte clave de lara tratamiento y seguridad. Asegúrese de hacer y acudir a todas las citas, y llame a lara médico si está teniendo problemas. También es debby buena idea saber los resultados de madiha exámenes y mantener debby lista de los medicamentos que ady.  ¿Cómo puede cuidarse en el hogar?  · Asegúrese de asistir a las citas prenatales. Lara médico le tomará la presión arterial en cada consulta. Lara médico también comprobará si tiene proteínas en lara orina. Tanto la presión arterial tigre bladimir la presencia de proteínas en la orina son señales de preeclampsia. Esta afección puede ser peligrosa tanto para usted bladimir para lara bebé.  · Ny abundantes líquidos, suficientes para que lara orina sea de color amarillo adelina o transparente bladimir el agua. La deshidratación puede causar contracciones. Si tiene debby enfermedad de los riñones, el corazón o el hígado y tiene que limitar los líquidos, hable con lara médico antes de aumentar lara consumo.  · Notifique a lara médico de inmediato si presenta cualquier síntoma de infección, tales bladimir:  ? Ardor cuando orina.  ? Flujo con mal olor de la vagina.  ? Comezón en la vagina.  ? Fiebre sin explicación.  ? Dolor o sensibilidad inusual en el útero o la parte baja del abdomen.  · Aliméntese en forma equilibrada. Incluya muchos alimentos que tien ricos en calcio y ming.  ? Entre los alimentos ricos en calcio se incluyen la  Berniece Pinna, el yogur, Jeannine Sanes y el brócoli. ? Entre los alimentos ricos en ming se incluyen las yue [place], los [place], las aves, los [place], los frijoles, las uvas pasas, el pan de grano integral y las verduras de hojas verdes. · No fume. Si necesita ayuda para dejar de fumar, hable con lara médico sobre programas y medicamentos para dejar de fumar. Estos pueden aumentar madiha probabilidades de dejar el hábito para siempre. · No ju alcohol ni use drogas ilegales. · Siga las instrucciones de lara médico acerca de la [place]. Lara médico le dirá cuánto ejercicio puede hacer. · Pregúntele a lara médico si puede tener [employer]. Si usted está en riesgo de tener trabajo de [place], lara médico podría pedirle que no tenga relaciones sexuales. · [place] precauciones para prevenir las caídas. Benjamin el embarazo las articulaciones están más sueltas y se tiene menos equilibrio. Los deportes tales bladimir el ciclismo, el esquí o el patinaje en línea pueden aumentar el riesgo de caídas. Y no monte a lizbeth, sandy en motocicleta, marlene clavados, marlene esquí acuático, bucee, ni salte en paracaídas mientras está embarazada. · Evite calentarse demasiado. No use saunas ni bañeras de hidromasaje. Evite la exposición al sol en climas calientes por mucho tiempo. [place] acetaminofén (Tylenol) para bajar debby fiebre tigre. · No tome medicamentos de venta leyla, productos herbarios ni suplementos sin hablar oh con lara médico o farmacéutico.  ¿Cuándo debe pedir ayuda? Llame al 911 en cualquier momento que considere que necesita atención de [place]. Por ejemplo, llame si:    · Se desmayó (perdió el conocimiento).     · Tiene convulsiones.     · Tiene sangrado vaginal intenso.     · Tiene dolor intenso en el abdomen o la pelvis.     · Le sale abundante líquido o gotea de la vagina y sabe o nila que el cordón umbilical se está saliendo a lara vagina.  Si esto sucede, arrodíllese de inmediato, de oswald forma que madiha nalgas estén más altas que lara jasmin. [place] disminuirá la presión sobre el cordón umbilical hasta que llegue la [facility]. Llame a lara médico ahora mismo o busque atención médica inmediata si:    · Tiene señales de preeclampsia, bladimir:  ? Hinchazón repentina de la dickson, las kris o los pies. ? Nuevos problemas de visión (bladimir oscurecimiento, bartolo borroso o bartolo puntos). ? Dolor de jasmin intenso.     · Tiene cualquier sangrado vaginal.     · Tiene dolor o cólicos abdominales.     · Tiene fiebre.     · Alexandra Crea tenido contracciones regulares (con o sin dolor) por [place]. [place] significa que tiene 8 o más contracciones en 1 hora o que tiene 4 contracciones o más en 20 minutos después de [place] de posición y tree líquidos.     · Tiene debby pérdida repentina de líquido por la vagina.     · Tiene dolor en la parte baja de la espalda o presión en la pelvis que no desaparece.     · Nota que lara bebé ha dejado de moverse o se mueve mucho menos de lo normal.   Preste especial atención a los cambios en lara kiran y asegúrese de comunicarse con lara médico si tiene algún problema. ¿Dónde puede encontrar más información en inglés? Sp [place] a [email or web address].erika.com/  Crissy John [id number] en la búsqueda para aprender más acerca de \"Precauciones en el embarazo: Instrucciones de cuidado. \"  Revisado: 11 febrero, 2020               Versión del contenido: 12.6  © 6766-2860 Healthwise, Incorporated. Las instrucciones de cuidado fueron adaptadas bajo licencia por [employer] (which disclaims liability or warranty for this information). Si usted tiene [place] afección médica o sobre estas instrucciones, siempre pregunte a lara profesional de kiran. [employer] niega toda garantía o responsabilidad por lara uso de esta información. Semanas 30 a 32 de lara embarazo:  Instrucciones de cuidado  Weeks 30 to 32 of Your Pregnancy: Care Instructions  Instrucciones de cuidado    Ha llegado a los últimos meses de embarazo. A estas [place], lara bebé en verdad comienza a tener la apariencia de un bebé, con josé y piel rellenita. A medida que entra en las últimas semanas de [facility] comenzará a caer en la realidad de que va a tener un bebé. Christel es el momento de elegir un nombre, ordenar lara casa, organizar un cuarto de niños seguro y buscar atención infantil de calidad, de ser necesaria. Hacer esto con anterioridad le permitirá concentrarse en cuidar y disfrutar de lara bebé. También podría hacer debby visita a la danya de partos del hospital para Marielos Foil mejor idea sobre qué esperar mientras está en el hospital.  Stephie estos últimos meses, es muy importante que se cuide y preste atención a lo que lara cuerpo necesita. Si lara médico le dice que está fidelina que trabaje, no se exija demasiado. Siga las recomendaciones proporcionadas en esta hoja de cuidados para aliviar la acidez estomacal y [address] várices. Si todavía no le brady aplicado la vacuna Tdap (tétanos, difteria y tos [place]) stephie christel [facility], hable con lara médico acerca de aplicársela. Willena Bihari a proteger a lara recién nacido contra la infección por tos ferina. La atención de seguimiento es debby parte clave de lara tratamiento y seguridad. Asegúrese de hacer y acudir a todas las citas, y llame a lara médico si está teniendo problemas. También es debby buena idea saber los resultados de madiha exámenes y mantener debby lista de los medicamentos que ady. ¿Cómo puede cuidarse en el hogar? Preste atención a los movimientos de lara bebé  · Debería sentir que lara bebé se mueve varias veces al día. · Lara bebé ahora cambia menos de posición, y patea y da golpes con más frecuencia. · Lara bebé duerme de 20 a 45 minutos cada vez y [address] en determinados momentos del día. · Si lara médico quiere que cuente las patadas de lara bebé:  ? Geraldene Axe lara vejiga y acuéstese de lado o recuéstese en debby silla cómoda. ?  Anote la hora inicial.  ? Preste atención solo a los movimientos de lara bebé. Cuente todos los movimientos, excepto los del hipo. ? Después de que haya contado 10 movimientos, anote la hora. ? Anote cuántos minutos le llevaron a lara bebé los 10 movimientos. ? Si después de debby hora no ha registrado 10 movimientos, coma o ju algo, y luego cuente por otra hora. Si no registra 10 movimientos en cualquiera de las horas, llame a lara médico.  Alivie la acidez estomacal  · Coma comidas pequeñas y frecuentes. · No coma chocolate, menta ni comidas muy picantes. Evite las bebidas con cafeína, bladimir el café, el té y las sodas. · Evite doblarse hacia adelante o acostarse después de comer. · Amilcar debby caminata corta después de comer. · Si tiene problemas de [place] estomacal stephie la noche, no coma por 2 horas antes de acostarse. · [place] antiácidos, bladimir Mylanta, Maalox, Rolaids o Tums. No tome antiácidos que contengan bicarbonato de sodio. Cuide las várices  · Las várices son vasos sanguíneos que se dilatan debido a la mayor cantidad de maria dolores stephie el embarazo. Puede tener dolor o palpitaciones en las piernas. La mayoría de las várices desaparecerán después del [place]. · Evite estar felix stephie mucho tiempo. Siéntese con las piernas cruzadas a la altura de los tobillos, no de las rodillas. · Siéntese con los pies elevados. · Evite usar ropa o medias ajustadas. Use medias de compresión. · Amilcar ejercicio con regularidad. Trate de caminar por lo menos 30 minutos al día. ¿Dónde puede encontrar más información en inglés? Vaya a http://www.gray.com/  Leonila [id number] en la búsqueda para aprender más acerca de \"Semanas 30 a 28 de lara embarazo: Instrucciones de cuidado. \"  Revisado: 11 febrero, 2020               Versión del contenido: 12.6  © 8555-6332 [employer]. Las instrucciones de cuidado fueron adaptadas bajo licencia por [employer] (which disclaims liability or warranty for this information).  Si usted tiene [place] afección médica o sobre estas instrucciones, siempre pregunte a lara profesional de kiran. [facility] niega toda garantía o responsabilidad por lara uso de esta información.

## 2021-01-21 LAB
GLUCOSE 1H P 100 G GLC PO SERPL-MCNC: 234 MG/DL (ref 65–140)
VZV IGG SER IA-ACNC: 175 INDEX

## 2021-01-21 NOTE — PROGRESS NOTES
VZV immune. 1hr GTT >200, indicates GDM. No need to proceed with 3hr. Will call Pt and discuss   1. risks of uncontrolled glucose including but not limited to macrosomia, birth injury, increased need for , birth defects, miscarriage/fetal demise,  hypoglycemia, persistent diabetes after pregnancy.    2. Start checking BG, Rec ReliOn Meter, goal fasting and 2hr pp sugars (<90 and <120 respectively)   3. Importance of low carb low sugar diet to help control sugars.

## 2021-01-22 ENCOUNTER — TELEPHONE (OUTPATIENT)
Dept: FAMILY MEDICINE CLINIC | Age: 36
End: 2021-01-22

## 2021-01-22 DIAGNOSIS — O24.419 GESTATIONAL DIABETES MELLITUS (GDM) IN THIRD TRIMESTER, GESTATIONAL DIABETES METHOD OF CONTROL UNSPECIFIED: Primary | ICD-10-CM

## 2021-01-22 PROCEDURE — 90471 IMMUNIZATION ADMIN: CPT | Performed by: STUDENT IN AN ORGANIZED HEALTH CARE EDUCATION/TRAINING PROGRAM

## 2021-01-22 PROCEDURE — 90715 TDAP VACCINE 7 YRS/> IM: CPT | Performed by: STUDENT IN AN ORGANIZED HEALTH CARE EDUCATION/TRAINING PROGRAM

## 2021-01-22 NOTE — TELEPHONE ENCOUNTER
Stratum #[id number] interpretor used     Called Pt to discuss 1hr GTT results and new diagnosis of GDM. -Asked Pt go to pharmacy to get ReliOn glucose meter and test strips and start checking blood sugar once fasting in the AM and 2 hours after each meal.    -Asked that she keep a log of the sugars.    -discussed risks of uncontrolled glucose including but not limited to macrosomia, birth injury, increased need for , birth defects, miscarriage/fetal demise,  hypoglycemia, persistent diabetes after pregnancy. - discussed goal fasting and 2hr pp sugars (<90 and <120 respectively)   - discussed importance of low carb low sugar diet to help control sugars. All questions were answered at this time. Fredy Todd D.O.    Family Medicine Resident PGY2

## 2021-01-25 ENCOUNTER — ROUTINE PRENATAL (OUTPATIENT)
Dept: FAMILY MEDICINE CLINIC | Age: 36
End: 2021-01-25
Payer: SUBSIDIZED

## 2021-01-25 DIAGNOSIS — Z3A.31 31 WEEKS GESTATION OF PREGNANCY: Primary | ICD-10-CM

## 2021-01-25 DIAGNOSIS — O24.419 GESTATIONAL DIABETES MELLITUS (GDM), ANTEPARTUM, GESTATIONAL DIABETES METHOD OF CONTROL UNSPECIFIED: ICD-10-CM

## 2021-01-25 PROCEDURE — 0502F SUBSEQUENT PRENATAL CARE: CPT | Performed by: STUDENT IN AN ORGANIZED HEALTH CARE EDUCATION/TRAINING PROGRAM

## 2021-01-25 NOTE — PROGRESS NOTES
I reviewed with the resident the medical history and the resident's findings on the physical examination. I discussed with the resident the patient's diagnosis and concur with the plan. 34yo [id number] @ 31w0d by 29wk scan   1. IUP: RH pos, GTT today, do not repeat GBS testing   2. Late to care: will defer dating to MFM, check UDS   3. Obesity: counseled on appropriate weight gain, measuring large, consider additional growth scan   4. AMA   5. Extra digit seen on baby: NIPT pending   6. Rubella NI: offer vaccine PP   7. GBS bacteriuria: abx at delivery   8. GDM: hasn't picked up meter or started checking. Discussed risks of uncontrolled glucose including but not limited to macrosomia, birth injury, increased need for , birth defects, miscarriage/fetal demise,  hypoglycemia, persistent diabetes after pregnancy. Will need growth scan.       Referred to [facility]

## 2021-01-25 NOTE — PROGRESS NOTES
Perfecto Manley  38 y.o. female  1985  [address] [place] [address], [address]  [id number]    [phone] (home)      [address] [place] [address] Rd:    [place] Telephone Encounter  Mainor Perez MD       Encounter Date: 2021 at 10:25 AM    Consent:  She and/or the health care decision maker is aware that this encounter will be billed as a routine OB appointment and that she may receive a bill for this telephone service, depending on her insurance coverage, and has provided verbal consent to proceed: Yes    Follow-up Prenatal     History of Present Illness   Perfecto Manley is a 38 y.o.  @ 31w0d by 29 wk us evaluated by telephone. Pregnancy complicated by obesity, AMA, rubella NI, GBS bacteriuria, GDM. Contractions: no  LOF: no  Vaginal bleeding: no  Fetal movement: yes    Not measuring glucose. Has not picked up glucometer yet. Not measuring BP. States she is keeping low carb diet. Taking ASA, prenatal vitamins      Vitals/Objective:   General: Patient speaking in complete sentences without effort. Normal speech and cooperative. Due to this being a Virtual Check-in/Telephone evaluation, many elements of the physical examination are unable to be assessed. Assessment and Plan:   Perfecto Manley is a 38 y.o.  @ 31w0d evaluated by telephone. 1.SIUP at 31w0d  -PNL:AB pos Ab neg, G/C neg, VZV needs collected, Rubella nonimmune,  RPR nr, HepB/HIV neg. Pap NIL. HgB 13.5  -Genetic testing: NIPT sent, pending  -Immunizations: flu and Tdap today   -Ultrasound: anatomy scan 1/15 at 29w4d- EFW 49th%,  EDC 3/29/21-extra small post-axial (\"pinky side\")- digit at least on the left side hands-(FOB and another child have extra digits per mom).        PREGNANCY CONCERNS  GDM:   - Patient was counseled on the importance of keeping glucose log.  Reviewed all the potential consequences of uncontrolled GDM including but not limited to macrosomia, birth injury, increased need for , birth defects, miscarriage/fetal demise,  hypoglycemia, persistent diabetes after pregnancy.    -Will need growth scan   -Diabetes education referral ordered  - continue keeping low carb diet and increasing aerobic exercise. Late to care: UDS neg. LMP did not correlate with 29 wk ultrasound. MFM recommended using her [place]-based [facility] from today of 3/21/21. Extra Digit: noted on MFM scan from 1/15. Pt states her  and daughter also have an extra digit.    - NIPT  not collected.      h/o Abnormal pap: pap at [address]  AMA: On ASA. Would like genetic testing, but will wait to get genetic testing after Medicaid approval.      Obesity: BMI ~40. Anatomy scan at 29w with EFW 49th%  -1hr GTT today  -may need growth scan prior to delivery, consider during next visit. Reassess.      BV: noted on pap smear. Asymptomatic. Treated wit Flagyl      GBS Bacteruria: >100K on UCx from [address] [facility]. -needs Abx at delivery      Rubella nonimmune- needs pp vaccination            BIRTH PLAN  ? Continuity Provider: JEAN-PAUL  ? Pain mgmt. in labor: undecided  ? Feeding plan: undecided  ? Social: Denies Tobacco, EtoH or illict drugs.     -Patient informed of her next appointment: 2021 telephone encounter with me.    -Advised to come in sooner for any concerns  -Pt informed that after 28 wk she will be asked to do weekly home BP monitoring and it was recommended she buy or borrow a cuff ahead of time. Alternative is going to a grocery store/pharmacy to check. One BP should be checked weekly and written in a log >28 weeks.  -Patient educated on kick counts (after 28 weeks): baby should move 10 times in 2 hours (can be a kick, roll, flutter, swish). -Patient was reminded about social distancing and to avoid going into public when possible. Patient understands that this encounter was a temporary measure, and the importance of further follow up and examination was emphasized. Patient verbalized understanding. I affirm this is a Patient Initiated Episode with an Established Patient who has not had a related appointment within my department in the past 7 days or scheduled within the next 24 hours. Note: not billable if this call serves to triage the patient into an appointment for the relevant concern      Electronically Signed: Lata Little MD  Providers location when delivering service: home      ICD-10-CM ICD-9-CM    1. 31 weeks gestation of pregnancy  Z3A.31 V22.2    2. Gestational diabetes mellitus (GDM), antepartum, gestational diabetes method of control unspecified  O24.419 648.83 REFERRAL TO DIABETIC EDUCATION       Pursuant to the emergency declaration under the [address] [place] [address] waiver authority and the [employer] and [employer] Act, this Virtual  Visit was conducted, with patient's consent, to reduce the patient's risk of exposure to COVID-19 and provide continuity of care for an established patient. History   Patients past medical, surgical and family histories were personally reviewed and updated. Patient Active Problem List   Diagnosis Code    GBS bacteriuria R82.71              Current Medications/Allergies   Medications and Allergies reviewed:    Current Outpatient Medications   Medication Sig Dispense Refill    aspirin delayed-release 81 mg tablet Take 1 Tab by mouth daily. 30 Tab 0    PNV with Ca No.65-Iron Poly-FA 60 mg iron-1 mg cap Take  by mouth.        No Known Allergies

## 2021-01-26 ENCOUNTER — VIRTUAL VISIT (OUTPATIENT)
Dept: DIABETES SERVICES | Age: 36
End: 2021-01-26
Payer: SUBSIDIZED

## 2021-01-26 DIAGNOSIS — O24.419 GESTATIONAL DIABETES MELLITUS (GDM), ANTEPARTUM, GESTATIONAL DIABETES METHOD OF CONTROL UNSPECIFIED: ICD-10-CM

## 2021-01-26 PROCEDURE — G0108 DIAB MANAGE TRN  PER INDIV: HCPCS | Performed by: DIETITIAN, REGISTERED

## 2021-01-26 NOTE — PROGRESS NOTES
[address] [place] [address] for Diabetes Health  Diabetes Self-Management Education & Support Program  Pre-program Assessment    Reason for Referral: GDM  Referral Source: Nora Khan MD  Services requested: Pregnancy DSMES    ASSESSMENT    From my perspective, the participant would benefit from [facility] specifically related to Healthy eating and Monitoring. During the program, we will focus on providing DSMES that specifically addresses participant's interest in Healthy eating and Monitoring, as shown by their reported readiness to change. The participant would be best served by attending individual sessions. Diabetes Self-Management Education Follow-up Visit: 21    Education begun today on what is GDM, risks to mom and baby reviewed, watched her use her meter and helped her learn how to use the lancing device correctly, proper disposal of sharps, GDM meal plan with carb goals per meal and snack, sources of carb and protein and portion control. Pt able to repeat BS goals and meal goals prior to session end. Next session; review intake and BS goals, exercise and prevention of Type 2 DM; after delivery care       Clinical Presentation  Sandy Salinas is a 38 y.o.  female referred for diabetes self-management education. Participant has GDM for <1 year. Family history negative for diabetes. Patient reports not receiving DSMES services in the past.   Pt is from [facility] and is  and no previous GDM that she knows of but reports things done differently in [place]. She lost 30# prior to the pregnancy because she was trying. [facility] 3/29/21  Used [employer]  services with Ebony Moeller Job # [id number]    Diabetes-related medications:  Current dosing:   Key Antihyperglycemic Medications     Patient is on no antihyperglycemic meds. Clot Prevention  Key Anti-Platelet Anticoagulant Meds             aspirin delayed-release 81 mg tablet Take 1 Tab by mouth daily.           Learning Assessment  Learning objectives Educator assessment (1/26/2021)   Diabetes Disease Process  The participant can   A) describe diabetes in basic terms;   B) state the type of diabetes they have; &   C) state accepted blood glucose targets. Healthy Eating  The participant can   A) identify carbohydrate foods; &   B) accurately read food labels. Being Active  The participant can  A) state the benefits of physical activity;  B) report their current PA practices;  C) identify PA they would consider incorporating in their lives; &  D) develop an implementation plan. Monitoring  The participant can  A) operate their blood glucose meter; &  B) describe how they log their blood glucoses to share with their provider. Taking Medications  The participant can  A) name their diabetes medications;  B) state the purpose and dose;  C) note side effects; &  D) describe proper storage, disposal & transport (if appropriate). Healthy Coping  The participant can    A) describe their response to diabetes diagnosis; B) describe their specific coping mechanisms;  C) identify supportive people and/or other resources that positively support their diabetes self-care and health. Reducing Risks  The participant can describe the preventive measures used by providers to promote health and prevent diabetes complications. Problem Solving  The participant can   A) identify signs, symptoms & treatment of hypoglycemia;   B) identify signs, symptoms & treatment of hyperglycemia;  C) describe their sick day plan; &  D) identify BG patterns to discuss with their provider.        No  Yes  Yes        No  Yes        Yes  Yes  Yes  No        No  Yes          na  na  na  No      Yes  Yes  Yes        No          No  No  No  No     Characteristics to Learning   Barriers to Learning   [] Cognitive loss  [] Mental retardation       [] Psychiatric disorder  [] Visually impaired  [] Hearing loss                 [] Low literacy  [] Low health literacy  [x] Language  [] Functional limitation  [] Pain   [] Financial  [] Transportation  [] None   Favorite Ways to Learn   [x] Lecture  [] Slides  [] Reading [] Video-Internet  [] Cassettes/CDs/MP3's  [] Interactive Small Groups [] Other       Behavioral Assessment  Current self-care practices  Educator assessment (1/26/2021)   Healthy Eating  Current practices  24-hour Dietary Recall:  Breakfast: warm milk and chocolate; 1 bread  Lunch: chicken, beef, 1/2 cup rice; salad  Dinner: 2 tortillas, 1/2 cup beans, gr. Chicken and avocado  Snacks: watermelon, coconut water and cookie  Beverages: water; coconut water, OJ  Alcohol: none     Would benefit from DSMES related to Healthy Eating: Yes      Eats a carbohydrate controlled diet: No      Stage of change: Preparation   Being Active  Current practices  How many days during the past week have you performed physical activity where your heart beats faster and your breathing is harder than normal for 30 minutes or more? 1 day    How many days in a typical week do you perform activity such as this?  1 day     Would benefit from [facility] related to Being Active: Yes      Exercises 150 minutes/week: No      Stage of change: Preparation     Monitoring  Current practices  Do you monitor your blood sugar? Yes    How often do you monitor? 4x/day    What are the range of readings? 154-244mg/dL- just started today  Breakfast: 154 mg/dL  1 hr post bkft; 244  1 hr post lunch  175       Would benefit from [facility] related to Monitoring: Yes      Uses BG readings to establish trends and understand BG patterns: No      Stage of change: Action   Taking Medication  Current practices  Do you understand what your diabetes medications do? na    How often do you miss doses of your diabetes medications?  Not taking diabetes medication    Can you afford your diabetes medications? na   Would benefit from [facility] related to Taking Medication: No      Takes medications consistently to receive full benefit: na      Stage of change: na       Healthy Coping   Current state     Would benefit from [facility] related to Healthy Coping: No      Identifies specific people, organizations,etc, that actively support their diabetes self-care efforts: Yes      Stage of change: Maintenance     Reducing Risks  Current state  Vaccines:  Influenza:   Immunization History   Administered Date(s) Administered    Influenza Vaccine [place]) PF (>6 Mo Flulaval, Fluarix, and >3 Yrs [facility] [address] 01/20/2021       Heart Protection:  BP Readings from Last 2 Encounters:   01/20/21 100/67   01/06/21 105/72             Would benefit from [facility] related to Reducing Risks: Yes, related to prevention of Type 2 diabetes in the future      Actively participates in decision-making with provider regarding secondary prevention:  Yes      Stage of change: Preparation   Problem Solving  Current state  Hypoglycemia Management:  What are signs and symptoms of hypoglycemia that you experience? Pt reported being unaware of s/s of hypoglycemia    How do you prevent hypoglycemia? Pt reported being unaware of how to prevent hypoglycemia    How do you treat hypoglycemia? Pt reported being unaware of how to treat hypoglycemia    Hyperglycemia Management:  What are signs and symptoms of hyperglycemia that you experience? Pt reported being unaware of s/s of hyperglycemia    How can you prevent hyperglycemia? Pt reported being unaware of how to prevent hyperglycemia    Sick Day Management:  What do you do differently on sick days? Pt reported being unaware of self-management on sick days    Pattern Management:  Do you notice blood glucose patterns when you look at the readings in your meter or logbook? No    How do you use the blood glucose readings from your meter or logbook?  Pt reported being unaware of pattern management skills     Would benefit from [facility] related to Problem Solving: No      Articulates appropriate strategies to address hypoglycemia, hyperglycemia, sick day care and BG pattern: na      Stage of change: na     Note: Content derived from the American Association of Diabetes Educators' Diabetes Education Curriculum: A Guide to Successful Self-Management (2nd edition)      Jazmin Shane RD on 1/26/2021 at 3:37 PM    [facility] [employer] for Telehealth:  Wilmer Fisher is a 38 y.o. female being evaluated through a synchronous (real-time) audio-video technology platform, as a substitution for an in-person encounter, to address the healthcare issues mentioned above. I was in the office. The patient was at home. A caregiver was present when appropriate. The patient and/or her healthcare decision maker, is aware that this patient-initiated, Telehealth encounter on 1/26/2021 is a billable service, with coverage as determined by her insurance carrier. She is aware that she may receive a bill and has provided verbal consent to proceed: Yes. This telehealth encounter occurred during the COVID-19 pandemic and public health emergency. Evaluation of the following organ systems was limited: Vitals/Constitutional/EENT/Resp/CV/GI//MS/Neuro/Skin/Heme-Lymph-Imm. Pursuant to the emergency declaration under the [facility], [address] [place] [address] waiver authority and the [employer] General Act, this Virtual Visit was conducted with patient's (and/or legal guardian's) consent, to reduce the risk of exposure to COVID-19 and provide necessary medical care.      Time in appointment: 60 minutes

## 2021-01-27 ENCOUNTER — TELEPHONE (OUTPATIENT)
Dept: FAMILY MEDICINE CLINIC | Age: 36
End: 2021-01-27

## 2021-01-28 DIAGNOSIS — O09.529 ANTEPARTUM MULTIGRAVIDA OF ADVANCED MATERNAL AGE: ICD-10-CM

## 2021-01-30 RX ORDER — ASPIRIN 81 MG/1
TABLET ORAL
Qty: 30 TAB | Refills: 0 | Status: SHIPPED | OUTPATIENT
Start: 2021-01-30 | End: 2021-02-15 | Stop reason: SDUPTHER

## 2021-01-30 NOTE — PROGRESS NOTES
Estimated Date of Delivery: 3/29/21  EDC assigned by 29 week sono  Normal anatomy except extra digit  F/u as CI

## 2021-02-01 ENCOUNTER — ROUTINE PRENATAL (OUTPATIENT)
Dept: FAMILY MEDICINE CLINIC | Age: 36
End: 2021-02-01
Payer: SUBSIDIZED

## 2021-02-01 DIAGNOSIS — Z3A.32 32 WEEKS GESTATION OF PREGNANCY: ICD-10-CM

## 2021-02-01 DIAGNOSIS — O24.419 GESTATIONAL DIABETES MELLITUS (GDM), ANTEPARTUM, GESTATIONAL DIABETES METHOD OF CONTROL UNSPECIFIED: Primary | ICD-10-CM

## 2021-02-01 PROCEDURE — 0502F SUBSEQUENT PRENATAL CARE: CPT | Performed by: STUDENT IN AN ORGANIZED HEALTH CARE EDUCATION/TRAINING PROGRAM

## 2021-02-01 RX ORDER — SYRINGE AND NEEDLE,INSULIN,1ML 28GX1/2"
SYRINGE, EMPTY DISPOSABLE MISCELLANEOUS
Qty: 100 SYRINGE | Refills: 3 | Status: SHIPPED | OUTPATIENT
Start: 2021-02-01 | End: 2021-03-24

## 2021-02-01 RX ORDER — PEN NEEDLE, DIABETIC 30 GX3/16"
NEEDLE, DISPOSABLE MISCELLANEOUS
Qty: 1 PACKAGE | Refills: 11 | Status: SHIPPED | OUTPATIENT
Start: 2021-02-01 | End: 2021-03-24

## 2021-02-01 RX ORDER — SYRINGE,NEEDLE,INSULN,SAFE,1ML 29 G X1/2"
SYRINGE, EMPTY DISPOSABLE MISCELLANEOUS
Qty: 100 SYRINGE | Refills: 3 | Status: SHIPPED | OUTPATIENT
Start: 2021-02-01 | End: 2021-02-01

## 2021-02-01 NOTE — PROGRESS NOTES
Greg Bradshaw  38 y.o. female  1985  [address] [place] [address], [address]  [id number]    [phone] (home)      [address]:    [facility] Telephone Encounter  Zeinab Leonard MD       Encounter Date: 2021 at 8:38 AM    Consent:  She and/or the health care decision maker is aware that this encounter will be billed as a routine OB appointment and that she may receive a bill for this telephone service, depending on her insurance coverage, and has provided verbal consent to proceed: Yes    Follow-up Prenatal     History of Present Illness   Greg Bradshaw is a 38 y.o.  @ 32w0d  by 29 wk us evaluated by telephone. Pregnancy complicated by obesity, AMA, rubella NI, GBS bacteriuria, GDM. GDM: Eating 1 tortilla a day, rice with most meals. Has been eating a lot of fruit. : 120 fasting  : 115 fasting 1h after breakfast: 217, 1h after lunch 174 1h after dinner 187  :120  fasting 1h after breakfast did not monitor 1h after lunch 121 1hafter dinner 179  : 131 fasting -1 hr after breakfast 180-1h after lunch 161,- 1 hr after dinner 121.   : 122 fasting - 1hr after breakfast 212- 1hr after lunch 138, 1hr after dinner 198  : 107 fasting- 1 hr after breakfast 180 1hr after lunch 143 1h after dinner 187  : 154 fasting 1h after breakfast 244 1hr after lunch 175 1hr after dinner did not measure       Contractions: no  LOF: no  Vaginal bleeding: no  Fetal movement: yes    Has not been measuring BP.        Taking ASA, prenatal vitamins    Vitals/Objective:   General: Patient speaking in complete sentences without effort. Normal speech and cooperative. Due to this being a Virtual Check-in/Telephone evaluation, many elements of the physical examination are unable to be assessed. Assessment and Plan:   Greg Bradshaw is a 38 y.o.  @ 32w0d evaluated by telephone.       1.SIUP   -PNL:AB pos Ab neg, G/C neg, VZV needs collected, Rubella nonimmune,  RPR nr, HepB/HIV neg. Pap NILM.  HgB 13.5  -Genetic testing: NIPT sent, pending  -Immunizations: s/p flu and Tdap    -Ultrasound: anatomy scan 1/15 at 29w4d- EFW 49th%,  EDC 3/29/21-extra small post-axial (\"pinky side\")- digit at least on the left side hands-(FOB and another child have extra digits per mom).       PREGNANCY CONCERNS  GDM:   - Patient was counseled on the importance of keeping glucose log. Reviewed all the potential consequences of uncontrolled GDM including but not limited to macrosomia, birth injury, increased need for , birth defects, miscarriage/fetal demise,  hypoglycemia, persistent diabetes after pregnancy.   - Will start patient on NPH 30 units in the morning and 10 units at night  -Will need growth scan   -Diabetes education following  - continue keeping low carb diet and increasing aerobic exercise.      Late to care: UDS neg.  LMP did not correlate with 29 wk ultrasound. MFM recommended using her US-based EDC from today of 3/21/21.      Extra Digit: noted on MFM scan from 1/15.  Pt states her  and daughter also have an extra digit.    - NIPT  not collected.      h/o Abnormal pap: pap at [address]  AMA: On ASA. Would like genetic testing, but will wait to get genetic testing after Medicaid approval.      Obesity: BMI ~40.  Anatomy scan at 29w with EFW 49th%  -1hr GTT today  -may need growth scan prior to delivery, consider during next visit. Reassess.      BV: noted on pap smear.  Asymptomatic.  Treated wit Flagyl      GBS Bacteruria: >100K on UCx from [facility]. -needs Abx at delivery      Rubella nonimmune- needs pp vaccination            BIRTH PLAN  ? Continuity Provider: MARISA JEONG  ? Pain mgmt. in labor: undecided  ? Feeding plan: undecided  ?  Social: Denies Tobacco, EtoH or illict drugs.   -Patient informed of her next appointment: 2/15/2021 (confirm if telephone or in office and inform patient)  -Advised to come in sooner for any concerns  -Pt informed that after 28 wk she will be asked to do weekly home BP monitoring and it was recommended she buy or borrow a cuff ahead of time. Alternative is going to a grocery store/pharmacy to check. One BP should be checked weekly and written in a log >28 weeks.  -Patient educated on kick counts (after 28 weeks): baby should move 10 times in 2 hours (can be a kick, roll, flutter, swish). -Patient was reminded about social distancing and to avoid going into public when possible. Patient understands that this encounter was a temporary measure, and the importance of further follow up and examination was emphasized. Patient verbalized understanding. I affirm this is a Patient Initiated Episode with an Established Patient who has not had a related appointment within my department in the past 7 days or scheduled within the next 24 hours. Note: not billable if this call serves to triage the patient into an appointment for the relevant concern      Electronically Signed: Jaclyn Inman MD  Providers location when delivering service: home      ICD-10-CM ICD-9-CM    1. Gestational diabetes mellitus (GDM), antepartum, gestational diabetes method of control unspecified  O24.419 648. 83 insulin NPH (NOVOLIN N, HUMULIN N) 100 unit/mL injection      Insulin Needles, Disposable, 31 gauge x 5/16\" ndle      insulin syringe,safetyneedle (Assure ID Insulin Safety) 0.5 mL 29 gauge x 1/2\" syrg   2. 32 weeks gestation of pregnancy  Z3A.32 V22.2        Pursuant to the emergency declaration under the [facility], [facility] waiver authority and the [employer] Act, this Virtual  Visit was conducted, with patient's consent, to reduce the patient's risk of exposure to COVID-19 and provide continuity of care for an established patient. History   Patients past medical, surgical and family histories were personally reviewed and updated.       Patient Active Problem List   Diagnosis Code    GBS bacteriuria R82.71              Current Medications/Allergies   Medications and Allergies reviewed:    Current Outpatient Medications   Medication Sig Dispense Refill    insulin NPH (NOVOLIN N, HUMULIN N) 100 unit/mL injection Please administer 30 units of NPH in the morning and 10 units in the evening. 1 Vial 3    Insulin Needles, Disposable, 31 gauge x 5/16\" ndle Please administer 30 units of NPH in the morning and 10 units in the evening. 1 Package 11    insulin syringe,safetyneedle (Assure ID Insulin Safety) 0.5 mL 29 gauge x 1/2\" syrg Please administer 30 units of NPH in the morning and 10 units in the evening. 100 Syringe 3    aspirin delayed-release 81 mg tablet TAKE 1 TABLET BY MOUTH EVERY DAY 30 Tab 0    PNV with Ca No.65-Iron Poly-FA 60 mg iron-1 mg cap Take  by mouth.        No Known Allergies

## 2021-02-02 ENCOUNTER — VIRTUAL VISIT (OUTPATIENT)
Dept: DIABETES SERVICES | Age: 36
End: 2021-02-02
Payer: SUBSIDIZED

## 2021-02-02 DIAGNOSIS — O24.419 GESTATIONAL DIABETES MELLITUS (GDM), ANTEPARTUM, GESTATIONAL DIABETES METHOD OF CONTROL UNSPECIFIED: Primary | ICD-10-CM

## 2021-02-02 PROCEDURE — G0108 DIAB MANAGE TRN  PER INDIV: HCPCS | Performed by: DIETITIAN, REGISTERED

## 2021-02-02 NOTE — PROGRESS NOTES
[employer] for Diabetes Health  Diabetes Self-Management Education & Support Program  Encounter note    SUMMARY  Diabetes self-care management training was completed related to Reducing risks, Healthy eating, Monitoring, Physical activity and Taking medications. . The participant did identify SMART Goal(s): - to d/c fruit juice and all sweetened beverages, and will practice knowledge and skills related to healthy eating and monitoring to improve diabetes self-management. EVALUATION:  Pt was in her car for this visit. She was parked but was distracted by her children in the car. She reports since yesterday's visit with her OB her BS have stabilized and she will not need the insulin. Per pt she never stopped eating what she wanted so that is why the BS were elevated. REcall from yesterday shows she only ate 60 gms carb of the whole day. She has picked up the insulin but no syringes and reports she has not idea how to take it. She was shown how to give an insulin injection today via virtual call. RECOMMENDATIONS:  1. Get insulin syringes from [employer]  2. If she does ot begin the insulin to let OB know ASAP she is not taking  3. She needs to eat some carbs at each meal as previously discussed     Next provider visit is scheduled for as needed  Visit was undertaken with help of [employer] language services Job # [id number] with Percy Le      SMART GOAL(S)  ACHIEVEMENT OF GOAL(S)  To discontinue all sweetened beverages including fruit juice and coconut water  [x] 0-24%     [] 25-49%     [] 50-74%     [] %             DATE DSMES TOPIC EVALUATION     2/2/2021 WHAT CAN I EAT?   a. General principles   b. Determining a healthy weight   c. Nutritional terms & tools    Healthy Plate method    Carbohydrate Counting    Reading food labels    Free apps   d. Pregnancy recommendations   e.  What to expect after delivery and how to prevent Type 2 DM in future     The participant    Uses Healthy Plate principles in constructing meals No   Reads food labels in choosing acceptable foods No    The participant needs to address reviewing information packet sent when it arrives in mail for all sources of carbohydrates. (sent last Tuesday)       DATE DSMES TOPIC EVALUATION     2/2/2021 HOW DO MY DIABETES MEDICATIONS WORK?   a. Type 1 medications & methods    Insulin injections    Injection sites   b. Type 2 medications    Oral agents    GLP-1 agonists   c. Hypoglycemia symptoms & treatment    Glucagon emergency kits   d. General guidance regarding insulin use whether Type 1, 2 or gestational diabetes    Storage of insulin    Disposal     Traveling with medications   e. Barriers to medication adherence      The participant    Can describe the expected action & side effects of prescribed diabetes medications No.   Can demonstrate injection technique (if applicable) No.    The participant needs to address  insulin syringes so she can begin to take her insulin. DATE DSMES TOPIC EVALUATION     2/2/2021 HOW DOES PHYSICAL ACTIVITY AFFECT MY DIABETES?   a. Benefits of physical activity   b. Beginning a program of physical activity    Walking    Pedometers    Goal setting   c. Structured physical activity program    Aerobic activity    Resistance    Flexibility    Balance   d. Physical activity program progression   e. Safety issues   f. Barriers to physical activity   g. Facilitators of physical activity        The participant has established a regular physical activity plan She feels her housework is adequate for aerobic activity    The participant needs to address none at this time  .          Shawn Peres RD on 2/2/2021 at 3:46 PM    [employer] Emergency Adaptations for Telehealth:    Emma Reese is a 38 y.o. female being evaluated through a synchronous (real-time) audio-video technology platform, as a substitution for an in-person encounter, to address the healthcare issues mentioned above. I was in the office. The patient was in her car. A caregiver was present when appropriate. The patient and/or her healthcare decision maker, is aware that this patient-initiated, Telehealth encounter on 2/2/2021 is a billable service, with coverage as determined by her insurance carrier. She is aware that she may receive a bill and has provided verbal consent to proceed: Yes. This telehealth encounter occurred during the COVID-19 pandemic and public health emergency. Evaluation of the following organ systems was limited: Vitals/Constitutional/EENT/Resp/CV/GI//MS/Neuro/Skin/Heme-Lymph-Imm. Pursuant to the emergency declaration under the [address] [place] [address], [address] [place] [address] authority and the [employer] Act, this Virtual Visit was conducted with patient's (and/or legal guardian's) consent, to reduce the risk of exposure to COVID-19 and provide necessary medical care.      Time in appointment: 30 minutes

## 2021-02-08 ENCOUNTER — ROUTINE PRENATAL (OUTPATIENT)
Dept: FAMILY MEDICINE CLINIC | Age: 36
End: 2021-02-08
Payer: SUBSIDIZED

## 2021-02-08 ENCOUNTER — TELEPHONE (OUTPATIENT)
Dept: FAMILY MEDICINE CLINIC | Age: 36
End: 2021-02-08

## 2021-02-08 DIAGNOSIS — Z3A.33 33 WEEKS GESTATION OF PREGNANCY: Primary | ICD-10-CM

## 2021-02-08 PROBLEM — O24.419 GDM, CLASS A2: Status: ACTIVE | Noted: 2021-02-08

## 2021-02-08 PROCEDURE — 0502F SUBSEQUENT PRENATAL CARE: CPT | Performed by: FAMILY MEDICINE

## 2021-02-08 NOTE — PROGRESS NOTES
Myah Castro  38 y.o. female  1985  [address] [place] [address], [address]  [id number]    [phone] (home)      [address]:    [facility] Telephone Encounter  Shaun Landry MD       Encounter Date: 2021 at 10:28 AM    Consent:  She and/or the health care decision maker is aware that this encounter will be billed as a routine OB appointment and that she may receive a bill for this telephone service, depending on her insurance coverage, and has provided verbal consent to proceed: Yes    Follow-up Prenatal     History of Present Illness   Contractions: no  LOF: no  Vaginal bleeding: no  Fetal movement (if >20 wk): yes    (If >28 weeks review blood pressure log, if diabetic review glucose log)  BG:   Checking   AM:  : 98   2/3  112    124    93    84    91    86    AB/AL/AD 2 hours    76, 83, 121  2/3  75,138,140    78, 120, 135    79, 108, ?    75, 83, 111    91, ?, ?        Vitals/Objective:   General: Patient speaking in complete sentences without effort. Normal speech and cooperative. Due to this being a Virtual Check-in/Telephone evaluation, many elements of the physical examination are unable to be assessed. Assessment and Plan:   Myah Castro is a 38 y.o.  @ 33w0d by 29wk scan evaluated by telephone. 1.SIUP   -PNL:AB pos Ab neg, G/C neg, VZV needs collected, Rubella nonimmune,  RPR nr, HepB/HIV neg.  Pap NILM.  HgB 13.5  -Genetic testing: NIPT low risk   -Immunizations: s/p flu and Tdap    -Ultrasound: anatomy scan 1/15 at 29w4d- EFW 49th%,  EDC     GDM A2: BG log reviewed close to goal will continue current regimen    - Patient was counseled on fetal risks by Dr. Tammy Lgeer   - cont NPH 30 units in the morning and 10 units at night  -Will likely need repeat growth scan      Extra Digit: noted on MFM scan from 1/15.  Pt states her  and daughter also have an extra digit.       h/o Abnormal pap: pap at [facility] NIL (no HPV?)    AMA: On ASA.      Obesity: BMI ~40.  Anatomy scan at 29w with EFW 49th%  -may need growth scan prior to delivery     BV: noted on pap smear.  Asymptomatic.  Treated with Flagyl      GBS Bacteruria: >100K on UCx from [facility]. S/p Tx   -needs Abx at delivery      Rubella nonimmune- needs pp vaccination       -Patient informed of her next appointment: 2/15/2021 (confirm if telephone or in office and inform patient)  -Advised to come in sooner for any concerns  -Pt informed that after 28 wk she will be asked to do weekly home BP monitoring and it was recommended she buy or borrow a cuff ahead of time. Alternative is going to a grocery store/pharmacy to check. One BP should be checked weekly and written in a log >28 weeks.  -Patient educated on kick counts (after 28 weeks): baby should move 10 times in 2 hours (can be a kick, roll, flutter, swish). -Patient was reminded about social distancing and to avoid going into public when possible. Patient understands that this encounter was a temporary measure, and the importance of further follow up and examination was emphasized. Patient verbalized understanding. I affirm this is a Patient Initiated Episode with an Established Patient who has not had a related appointment within my department in the past 7 days or scheduled within the next 24 hours. Note: not billable if this call serves to triage the patient into an appointment for the relevant concern      Electronically Signed: Manny Fontaine MD  Providers location when delivering service: home     No diagnosis found. Pursuant to the emergency declaration under the [facility] waiver authority and the [employer] Act, this Virtual  Visit was conducted, with patient's consent, to reduce the patient's risk of exposure to COVID-19 and provide continuity of care for an established patient.       History Patients past medical, surgical and family histories were personally reviewed and updated. yes    Patient Active Problem List   Diagnosis Code    GBS bacteriuria R82.71              Current Medications/Allergies   Medications and Allergies reviewed:    Current Outpatient Medications   Medication Sig Dispense Refill    insulin NPH (NOVOLIN N, HUMULIN N) 100 unit/mL injection Please administer 30 units of NPH in the morning and 10 units in the evening. 1 Vial 3    Insulin Needles, Disposable, 31 gauge x 5/16\" ndle Please administer 30 units of NPH in the morning and 10 units in the evening. 1 Package 11    insulin syringe,safetyneedle (BD SafetyGlide Insulin Syringe) 0.5 mL 31 gauge x 15/64\" syrg 30 Units Every morning AND 10 Units every evening. 100 Syringe 3    aspirin delayed-release 81 mg tablet TAKE 1 TABLET BY MOUTH EVERY DAY 30 Tab 0    PNV with Ca No.65-Iron Poly-FA 60 mg iron-1 mg cap Take  by mouth.        No Known Allergies

## 2021-02-08 NOTE — PROGRESS NOTES
I reviewed with the resident the medical history and the resident's findings on the physical examination. I discussed with the resident the patient's diagnosis and concur with the plan. Overall glucoses better on insulin       34yo  @ 33w0d by 29wk scan   1. IUP: RH pos, GTT today, do not repeat GBS testing   2. Late to care: will defer dating to MFM, check UDS   3. Obesity: counseled on appropriate weight gain, measuring large, consider additional growth scan   4. AMA   5. Extra digit seen on baby: NIPT wnl  6. Rubella NI: offer vaccine PP   7. GBS bacteriuria: abx at delivery   8. GDM: will start insulin - about 3/4 of weight based since insulin naive but can't afford short acting insulin - NPH /10. F/up in 1 week. Discussed risks of uncontrolled glucose including but not limited to macrosomia, birth injury, increased need for , birth defects, miscarriage/fetal demise,  hypoglycemia, persistent diabetes after pregnancy. Will need growth scan.       Referred to [facility]

## 2021-02-08 NOTE — TELEPHONE ENCOUNTER
Good Morning ,  I received a fax from [facility] stating that they would like you to change the Relion Pen needle 31G/8MM MIS, they want you to do a therapeutic change in which they stated \" Please change to syringes as PT is using insulin Vial. Thanks\" I will put fax in your box so you can look at it. Can you help me with this?    Thank you

## 2021-02-15 ENCOUNTER — ROUTINE PRENATAL (OUTPATIENT)
Dept: FAMILY MEDICINE CLINIC | Age: 36
End: 2021-02-15
Payer: SUBSIDIZED

## 2021-02-15 VITALS
OXYGEN SATURATION: 98 % | SYSTOLIC BLOOD PRESSURE: 113 MMHG | DIASTOLIC BLOOD PRESSURE: 72 MMHG | WEIGHT: 223 LBS | BODY MASS INDEX: 41.04 KG/M2 | RESPIRATION RATE: 16 BRPM | HEIGHT: 62 IN | HEART RATE: 94 BPM | TEMPERATURE: 96.4 F

## 2021-02-15 DIAGNOSIS — O24.419 GDM, CLASS A2: ICD-10-CM

## 2021-02-15 DIAGNOSIS — Z3A.34 34 WEEKS GESTATION OF PREGNANCY: Primary | ICD-10-CM

## 2021-02-15 DIAGNOSIS — O09.529 ANTEPARTUM MULTIGRAVIDA OF ADVANCED MATERNAL AGE: ICD-10-CM

## 2021-02-15 DIAGNOSIS — O99.213 OBESITY AFFECTING PREGNANCY IN THIRD TRIMESTER: ICD-10-CM

## 2021-02-15 LAB
BILIRUB UR QL STRIP: NEGATIVE
GLUCOSE UR-MCNC: NEGATIVE MG/DL
KETONES P FAST UR STRIP-MCNC: NEGATIVE MG/DL
PH UR STRIP: 5.5 [PH] (ref 4.6–8)
PROT UR QL STRIP: NEGATIVE
SP GR UR STRIP: 1.02 (ref 1–1.03)
UA UROBILINOGEN AMB POC: NORMAL (ref 0.2–1)
URINALYSIS CLARITY POC: CLEAR
URINALYSIS COLOR POC: YELLOW
URINE BLOOD POC: NORMAL
URINE LEUKOCYTES POC: NEGATIVE
URINE NITRITES POC: NEGATIVE

## 2021-02-15 PROCEDURE — 0502F SUBSEQUENT PRENATAL CARE: CPT | Performed by: STUDENT IN AN ORGANIZED HEALTH CARE EDUCATION/TRAINING PROGRAM

## 2021-02-15 RX ORDER — ASPIRIN 81 MG/1
81 TABLET ORAL DAILY
Qty: 90 TAB | Refills: 1 | Status: SHIPPED | OUTPATIENT
Start: 2021-02-15 | End: 2021-02-22 | Stop reason: SDUPTHER

## 2021-02-15 NOTE — PROGRESS NOTES
I reviewed with the resident the medical history and the resident's findings on the physical examination.  I discussed with the resident the patient's diagnosis and concur with the plan.    34yo  @ 34w0d by 29wk scan   1.  IUP: RH pos, do not repeat GBS testing   2.  Late to care: will defer dating to MFM, check UDS   3.  Obesity: counseled on appropriate weight gain  4.  AMA: ASA   5.  Extra digit seen on baby: NIPT wnl  6.  Rubella NI: offer vaccine PP   7.  GBS bacteriuria: abx at delivery   8.  GDM: log improved, a couple fastings in 60s, adjusting NPH to 10/16  Discussed risks of uncontrolled glucose including but not limited to macrosomia, birth injury, increased need for , birth defects, miscarriage/fetal demise,  hypoglycemia, persistent diabetes after pregnancy.  Will need growth scan.      Referred to [facility]

## 2021-02-15 NOTE — PROGRESS NOTES
Emilia Sandoval is a 38 y.o. female    Chief Complaint   Patient presents with    Routine Prenatal Visit     Patient is coming in for her routine prenatal visit. She is 34 weeks. She is not having vaginal bleeding or discharge. She is taking her prenatal vitamins. She is having fetal movement. She brought her glucose logs with her. No contractions. No other concerns. 1. Have you been to the ER, urgent care clinic since your last visit? Hospitalized since your last visit? No  M  2. Have you seen or consulted any other health care providers outside of the [address] [place] [address] since your last visit? Include any pap smears or colon screening. No      Visit Vitals  /72 (BP 1 Location: Right upper arm, BP Patient Position: Sitting)   Pulse 94   Temp (!) 96.4 °F (35.8 °C) (Temporal)   Resp 16   Ht 5' 1.5\" (1.562 m)   Wt 223 lb (101.2 kg)   SpO2 98%   BMI 41.45 kg/m²           Health Maintenance Due   Topic Date Due    COVID-19 Vaccine (1 of 2) 02/17/1999         Medication Reconciliation completed, changes noted.   Please  Update medication list.    Vaginal Bleeding: No  Vaginal discharge: No  Fetal movement: Yes  Contractions: no   Prenatal Vitamins: yes

## 2021-02-15 NOTE — PATIENT INSTRUCTIONS
Semanas 34 a 36 de lara embarazo: Instrucciones de cuidado  Weeks 34 to 36 of Your Pregnancy: Care Instructions  Instrucciones de cuidado    A estas [place], lara bebé y lara abdomen habrán crecido considerablemente. Brynn es [place] de yared a lizett. Los pulmones de lara bebé están brynn listos para respirar aire. Los huesos de la jasmin de lara bebé ahora son bastante firmes bladimir para protegerla zeb se mantienen lo suficientemente blandos bladimir para atravesar el canal de [place]. Es posible que sienta entusiasmo, nancy, ansiedad o miedo. Quizá se pregunte cómo se dará cuenta de si está en trabajo de parto o qué esperar en nito momento. Trate de ser flexible con madiha expectativas respecto del nacimiento. Dado que cada nacimiento es diferente, no hay manera de saber exactamente cómo será lara parto. Esta hoja de cuidados la ayudará a saber qué esperar y cómo prepararse. Le podría facilitar el parto. Si todavía no le brady aplicado la vacuna Tdap (tétanos, difteria y tos [place]) stephie moshe [place], hable con lara médico acerca de aplicársela. Bethena Maffucci a proteger a lara recién nacido contra la infección por tos ferina. En la semana 36, a la mayoría de las mujeres se les hace debby prueba de estreptococos del yovanny B (GBS, por madiha siglas en inglés). Los estreptococos del yovanny B son bacterias comunes que pueden vivir en la vagina y el recto. Pueden hacer que lara bebé se enferme después del parto. Si el resultado es positivo, usted recibirá antibióticos stephie el trabajo de [place]. Los medicamentos evitarán que lara bebé contraiga las bacterias. La atención de seguimiento es debby parte clave de lara tratamiento y seguridad. Asegúrese de hacer y acudir a todas las citas, y llame a lara médico si está teniendo problemas. También es debby buena idea saber los resultados de madiha exámenes y mantener debby lista de los medicamentos que ady. ¿Cómo puede cuidarse en el hogar?   Aprenda sobre las alternativas para aliviar el dolor  · El dolor se [place] de modo diferente en cada gatito. Hable con lara médico acerca de madiha sentimientos sobre el dolor. · Puede elegir entre varias formas de aliviar el dolor. Estas incluyen medicamentos o técnicas de respiración, así bladimir medidas para estar cómoda. Usted puede utilizar más de [place] opción. · Si elige un analgésico (medicamento para el dolor) stephie el trabajo de [place], hable con lara médico acerca de madiha opciones. Algunos medicamentos reducen la ansiedad y [facility] a aliviar parte del dolor. Otros adormecen la parte inferior del cuerpo para que no sienta dolor. · Asegúrese de decirle a lara médico acerca de lara elección de analgésico antes de empezar el trabajo de parto o muy temprano en el [place]. Es posible que pueda cambiar de parecer a medida que avanza el [place]. · Mary vez se duerme a debby gatito con medicamentos administrados a través de debby máscara o por vía intravenosa (IV). Trabajo de parto y [place]  · La primera etapa del [place] de parto se divide en jarrell fases: Ludger Guarneri y de transición. ? La mayoría de las mujeres experimentan la fase latente del [place] de parto en madiha hogares. Usted puede [employer] o descansar, comer refrigerios livianos, beber líquidos titus y comenzar a contar las contracciones. ? Cuando advierta que se le vuelve difícil hablar stephie debby contracción, es posible que esté por pasar a la fase activa. Stephie la fase Lisette Imelda, debería ir al hospital si no está allí aún. ? Usted está en la fase activa cuando tiene contracciones cada 3 o 4 minutos y saleem alrededor de 60 segundos. El seb uterino comienza a abrirse con más rapidez.  ? Si se le rompe la karla, las contracciones serán más intensas y más frecuentes. ? Stephie la fase de transición, el seb uterino se estira y las contracciones se producen con Jessica Bruno. ? Josphine [place] tenga deseos de pujar, sin embargo es posible que el seb uterino aún no esté preparado.  El [employer] dirá cuándo pujar.  · La segunda etapa comienza cuando el seb uterino se abre por completo y usted está lista para pujar. ? Las contracciones son muy intensas a fin de empujar al bebé por el canal de parto. ? Sentirá la necesidad de pujar. Podría sentir bladimir si tuviera ganas de evacuar el intestino. ? Negro Jones entrenen a [employer]. Estas contracciones serán muy intensas zeb no ocurrirán con tanta frecuencia. Puede descansar un poco entre contracciones. ? Es posible que esté sensible e irritable. Es posible que no se dé cuenta de lo que pasa a lara alrededor. ? Un último esfuerzo y habrá nacido lara bebé. · La tercera etapa ocurre cuando con unas cuantas contracciones más se expulsa la placenta. [place] puede durar 30 minutos o menos. · La cuarta etapa es la de recuperación. Es posible que se sienta abrumada con las emociones y exhausta zeb alerta. Christel es un buen momento para comenzar el amamantamiento. ¿Dónde puede encontrar más información en inglés? Vaya a [email or web address]  Lester Clarke [id number] en la búsqueda para aprender más acerca de \"Semanas 34 a 39 de lara embarazo: Instrucciones de cuidado. \"  Revisado: 11 febrero, 2020               Versión del contenido: 12.6  © 1752-6325 Healthwise, Incorporated. Las instrucciones de cuidado fueron adaptadas bajo licencia por [employer] (which disclaims liability or warranty for this information). Si usted tiene [place] afección médica o sobre estas instrucciones, siempre pregunte a lara profesional de kiran. Healthwise, Incorporated niega toda garantía o responsabilidad por lara uso de esta información. Dieta para la diabetes gestacional: Instrucciones de cuidado  Gestational Diabetes Diet: Care Instructions  Instrucciones de cuidado    La diabetes gestacional es debby forma de diabetes que puede presentarse stephie el Jonny Vargas. Suele desaparecer después de que nace el bebé.  La diabetes significa que el páncreas no puede producir suficiente insulina o que el organismo no la puede usar de [place]. La insulina ayuda a que el azúcar [employer], donde se [place] bladimir energía. Es posible que usted pueda controlar lara nivel de azúcar en la maria dolores stephie el embarazo con debby Terryann Quentin saludable y haciendo ejercicio con regularidad. Un dietista o un educador certificado en diabetes (CDE, por madiha siglas en inglés) le puede ayudar a preparar un plan de alimentación. Christel plan le ayudará a controlar el nivel de azúcar en la maria dolores y les brindará debby buena nutrición a usted y a lara bebé. Si la dieta y el ejercicio no reducen ni controlan lara nivel de azúcar en la maria dolores, podría necesitar insulina o medicamentos para la diabetes. La atención de seguimiento es debby parte clave de lara tratamiento y seguridad. Asegúrese de hacer y acudir a todas las citas, y llame a lara médico si está teniendo problemas. También es debby buena idea saber los resultados de madiha exámenes y mantener debby lista de los medicamentos que ady. ¿Cómo puede cuidarse en el hogar? · Sepa qué alimentos contienen carbohidratos. Consumir demasiados carbohidratos hará que lara nivel de azúcar en la maria doolres se eleve demasiado. Entre los alimentos que contienen carbohidratos se encuentran:  ? Los panes, los cereales, la pasta y el arroz. ? Los frijoles (habichuelas) secos y las verduras con almidón, bladimir el maíz (elote), las arvejas (chícharos) y las dennis. ? Las frutas y el jugo de frutas, la [place] y el yogur. ? Los dulces, el azúcar de [place], las sodas y las bebidas endulzadas con azúcar. · Sepa qué cantidad de carbohidratos necesita por día. Un dietista o un educador de diabetes certificado le puede enseñar a llevar la cuenta de la cantidad de carbohidratos que consume. · Trate de consumir la misma cantidad de carbohidratos en cada comida. [place] le ayudará a mantener estable el nivel de azúcar en la maria dolores.  No acumule lara cuota diaria de carbohidratos para consumirlos en debby diamond comida. · Limite los alimentos con azúcar añadida. Entre estos se encuentran los [place], los postres y las sodas. Estos alimentos necesitan contarse bladimir parte de lara consumo total de carbohidratos para el día. · No ju alcohol. El alcohol no es seguro ni para usted ni para lara bebé. · No se salte las comidas. Si omite comidas y [place], lara nivel de azúcar en la maria dolores podría bajar demasiado. · Anote lo que come cada día. Revise lara registro con lara dietista o lara educador de diabetes para determinar si está consumiendo las cantidades correctas de alimentos. · Lo oh que debe hacer en la mañana antes de comer es revisarse el nivel de azúcar en la [place]. Después, revísese el nivel de azúcar de la maria dolores 1 o 2 horas después del primer bocado de cada comida (o bladimir se lo recomiende lara médico). [place] le permitirá determinar de qué manera los alimentos que consume le afectan el nivel de azúcar en la maria dolores. Lleve el registro de esos niveles y muéstreselo a lara médico.  ¿Cuándo debe pedir ayuda? Preste especial atención a los cambios en lara kiran y asegúrese de comunicarse con lara médico si:    · Tiene preguntas acerca de lara dieta.     · Frecuentemente tiene problemas con niveles elevados o bajos de azúcar en la maria dolores. ¿Dónde puede encontrar más información en inglés? Bonnee Pleasure a [email or web address]  Leonila [id number] en la búsqueda para aprender más acerca de \"Dieta para la diabetes gestacional: Instrucciones de cuidado. \"  Revisado: 20 estevan, 8643               BIPWYQQ del contenido: 12.6  © 1263-3065 [facility] [employer]orporated. Las instrucciones de cuidado fueron adaptadas bajo licencia por [employer] (which disclaims liability or warranty for this information). Si usted tiene [place] afección médica o sobre estas instrucciones, siempre pregunte a lara profesional de kiran.  Bryon Danger por lara uso de esta información. Precauciones en el embarazo: Instrucciones de cuidado  Pregnancy Precautions: Care Instructions  Instrucciones de cuidado    No hay debby manera samaniego de prevenir el trabajo de parto antes de la fecha esperada (trabajo de parto prematuro) o de prevenir la mayoría de otros problemas en el Ulis Agustin. Amna hay cosas que puede hacer para aumentar las probabilidades de tener un embarazo saludable. Vaya a madiha citas, siga los consejos de lara médico y cuídese. Coma fidelina y marlene ejercicio (si lara médico lo permite). Y asegúrese de tree abundante agua. La atención de seguimiento es debby parte clave de lara tratamiento y seguridad. Asegúrese de hacer y acudir a todas las citas, y llame a lara médico si está teniendo problemas. También es debby buena idea saber los resultados de madiha exámenes y mantener debby lista de los medicamentos que ady. ¿Cómo puede cuidarse en el hogar? · Asegúrese de asistir a las citas prenatales. Lara médico le tomará la presión arterial en cada consulta. Lara médico también comprobará si tiene proteínas en lara orina. Tanto la presión arterial tigre bladimir la presencia de proteínas en la orina son señales de preeclampsia. Esta afección puede ser peligrosa tanto para usted bladimir para lara bebé. · [place] abundantes líquidos, suficientes para que lara orina sea de color amarillo adelina o transparente bladimir el agua. La deshidratación puede causar contracciones. Si tiene [employer], el corazón o el hígado y tiene que [place] líquidos, hable con lara médico antes de aumentar lara consumo. · Notifique a lara médico de inmediato si presenta cualquier síntoma de infección, tales bladimir:  ? Ardor cuando orina. ? Flujo con mal olor de la vagina. ? Comezón en la vagina. ? Omar Christi sin explicación. ? Dolor o sensibilidad inusual en el útero o la parte baja del abdomen. · Aliméntese en forma equilibrada. Incluya muchos alimentos que tien ricos en calcio y ming. ?  [employer] alimentos ricos en calcio se incluyen la [place], el [place]bar[place], el yogur, Dwana Baptise y el brócoli. ? Entre los alimentos ricos en ming se incluyen las yue [place], los [place], las aves, los [place], los frijoles, las uvas pasas, el pan de grano integral y las verduras de hojas verdes. · No fume. Si necesita ayuda para dejar de fumar, hable con lara médico sobre programas y medicamentos para dejar de fumar. Estos pueden aumentar madiha probabilidades de dejar el hábito para siempre. · No ju alcohol ni use drogas ilegales. · Siga las instrucciones de lara médico acerca de la [place]. Lara médico le dirá cuánto ejercicio puede hacer. · Pregúntele a lara médico si puede tener [employer]. Si usted está en riesgo de tener trabajo de [place], lara médico podría pedirle que no tenga relaciones sexuales. · [place] precauciones para prevenir las caídas. Benjamin el embarazo las articulaciones están más sueltas y se tiene menos equilibrio. Los deportes tales bladimir el ciclismo, el esquí o el patinaje en línea pueden aumentar el riesgo de caídas. Y no monte a lizbeth, sandy en motocicleta, marlene clavados, marlene esquí acuático, bucee, ni salte en paracaídas mientras está embarazada. · Evite calentarse demasiado. No use saunas ni bañeras de hidromasaje. Evite la exposición al sol en climas calientes por mucho tiempo. [place] acetaminofén (Tylenol) para bajar debby fiebre tigre. · No tome medicamentos de venta leyla, productos herbarios ni suplementos sin hablar oh con lara médico o farmacéutico.  ¿Cuándo debe pedir ayuda? Llame al 911 en cualquier momento que considere que necesita atención de [place]. Por ejemplo, llame si:    · Se desmayó (perdió el conocimiento).     · Tiene convulsiones.     · Tiene sangrado vaginal intenso.     · Tiene dolor intenso en el abdomen o la pelvis.     · Le sale abundante líquido o gotea de la vagina y sabe o nila que el cordón umbilical se está saliendo a lara vagina.  Si esto sucede, arrodíllese de inmediato, de oswald forma que madiha nalgas estén más altas que lara jasmin. [place] disminuirá la presión sobre el cordón umbilical hasta que llegue la [facility]. Llame a lara médico ahora mismo o busque atención médica inmediata si:    · Tiene señales de preeclampsia, bladimir:  ? Hinchazón repentina de la dickson, las kris o los pies. ? Nuevos problemas de visión (bladimir oscurecimiento, bartolo borroso o bartolo puntos). ? Dolor de jasmin intenso.     · Tiene cualquier sangrado vaginal.     · Tiene dolor o cólicos abdominales.     · Tiene fiebre.     · Delilah Adjutant tenido contracciones regulares (con o sin dolor) por Yakov Solorzano. [place] significa que tiene 8 o más contracciones en 1 hora o que tiene 4 contracciones o más en 20 minutos después de [place] de posición y tree líquidos.     · Tiene debby pérdida repentina de líquido por la vagina.     · Tiene dolor en la parte baja de la espalda o presión en la pelvis que no desaparece.     · Nota que lara bebé ha dejado de moverse o se mueve mucho menos de lo normal.   Preste especial atención a los cambios en lara kiran y asegúrese de comunicarse con lara médico si tiene algún problema. ¿Dónde puede encontrar más información en inglés? Marilee Kurtz a [email or web address]  Leonila [id number] en la búsqueda para aprender más acerca de \"Precauciones en el embarazo: Instrucciones de cuidado. \"  Revisado: 11 febrero, 2020               Versión del contenido: 12.6  © 7047-4390 Healthwise, Incorporated. Las instrucciones de cuidado fueron adaptadas bajo licencia por [employer] (which disclaims liability or warranty for this information). Si usted tiene [place] afección médica o sobre estas instrucciones, siempre pregunte a lara profesional de kiran. [facility], Incorporated niega toda garantía o responsabilidad por lara uso de esta información.

## 2021-02-15 NOTE — PROGRESS NOTES
Return OB Visit       Subjective:   Jaclyn Cruz 38 y.o.   LAURE: 3/29/2021, by Ultrasound  GA:  34w0d. [email or web address] interpretor     LOF: no  Vaginal bleeding: no  Fetal movement (after 20 weeks): yes  Contractions: no    GDM - Pt was placed on insulin 21       LEFT SIDE IS AM FASTING SUGARS   DISREGARD RIGHT SIDE- THESE ARE TAKEN JUST AFTER EATING         2 HOURS POST PRANDIAL       Pt has been taking 10 Units in the AM and 20U at night. Pt states she was unaware that the insulin was prescribed 30U in AM and 10U in PM.   She has been watching her diet closely and started a walking regimen. Pt was taking the ASA but it ran out after 1 month and she     Pt denies fever, chills, HA, vision disturbances, RUQ pain, chest pain, SOB, N/V/D, constipation, urinary problems, foul smelling vaginal discharge, LE Edema worse than usual.     OB History    Para Term  AB Living   4 3 3     3   SAB TAB Ectopic Molar Multiple Live Births             3      # Outcome Date GA Lbr Luc/2nd Weight Sex Delivery Anes PTL Lv   4 Current            3 Term 06/07/15 37w0d  8 lb (3.629 kg) F Vag-Spont   ANSON   2 Term 12 37w0d  8 lb (3.629 kg) M Vag-Spont   ANSON   1 Term 03 38w0d  7 lb (3.175 kg) M Vag-Spont   ANSON      Obstetric Comments   First 3 babies all born in [facility]        Allergies- reviewed:   No Known Allergies  Medications- reviewed:   Current Outpatient Medications   Medication Sig    aspirin delayed-release 81 mg tablet Take 1 Tab by mouth daily for 90 days.  insulin NPH (NOVOLIN N, HUMULIN N) 100 unit/mL injection Please administer 30 units of NPH in the morning and 10 units in the evening.  Insulin Needles, Disposable, 31 gauge x 5/16\" ndle Please administer 30 units of NPH in the morning and 10 units in the evening.  insulin syringe,safetyneedle (BD SafetyGlide Insulin Syringe) 0.5 mL 31 gauge x 15/64\" syrg 30 Units Every morning AND 10 Units every evening.     PNV with Ca No.65-Iron Poly-FA 60 mg iron-1 mg cap Take  by mouth.     No current facility-administered medications for this visit.      Past Medical History- reviewed:  Past Medical History:   Diagnosis Date   • Abnormal Papanicolaou smear of cervix    • History of abnormal cervical Pap smear 2017     Past Surgical History- reviewed:   History reviewed. No pertinent surgical history.  Social History- reviewed:  Social History     Socioeconomic History   • Marital status: SINGLE     Spouse name: Not on file   • Number of children: Not on file   • Years of education: Not on file   • Highest education level: Not on file   Occupational History   • Not on file   Social Needs   • Financial resource strain: Not on file   • Food insecurity     Worry: Not on file     Inability: Not on file   • Transportation needs     Medical: Not on file     Non-medical: Not on file   Tobacco Use   • Smoking status: Never Smoker   • Smokeless tobacco: Never Used   Substance and Sexual Activity   • Alcohol use: Never     Frequency: Never     Binge frequency: Never   • Drug use: Never   • Sexual activity: Yes     Partners: Male   Lifestyle   • Physical activity     Days per week: Not on file     Minutes per session: Not on file   • Stress: Not on file   Relationships   • Social connections     Talks on phone: Not on file     Gets together: Not on file     Attends Restorationism service: Not on file     Active member of club or organization: Not on file     Attends meetings of clubs or organizations: Not on file     Relationship status: Not on file   • Intimate partner violence     Fear of current or ex partner: Not on file     Emotionally abused: Not on file     Physically abused: Not on file     Forced sexual activity: Not on file   Other Topics Concern   • Not on file   Social History Narrative    Patient used to work in a [employer]      Immunizations- reviewed:   Immunization History   Administered Date(s) Administered   • Influenza Vaccine  [employer]) PF (>6 Mo Flulaval, Fluarix, and >3 Yrs Bertrand Ajay [id number]) 2021    Tdap 2021       Objective:     Visit Vitals  /72 (BP 1 Location: Right upper arm, BP Patient Position: Sitting)   Pulse 94   Temp (!) 96.4 °F (35.8 °C) (Temporal)   Resp 16   Ht 5' 1.5\" (1.562 m)   Wt 223 lb (101.2 kg)   LMP 07/15/2020 (Approximate)   SpO2 98%   BMI 41.45 kg/m²       Physical Exam:  GENERAL APPEARANCE: alert, well appearing, in no apparent distress  ABDOMEN: gravid, Fundal height 41 FHT present at 130  bpm  PSYCH: normal mood and affect     Labs  Recent Results (from the past 12 hour(s))   AMB POC URINALYSIS DIP STICK AUTO W/O MICRO    Collection Time: 02/15/21  9:21 AM   Result Value Ref Range    Color (UA POC) Yellow     Clarity (UA POC) Clear     Glucose (UA POC) Negative Negative    Bilirubin (UA POC) Negative Negative    Ketones (UA POC) Negative Negative    Specific gravity (UA POC) 1.025 1.001 - 1.035    Blood (UA POC) 1+ Negative    pH (UA POC) 5.5 4.6 - 8.0    Protein (UA POC) Negative Negative    Urobilinogen (UA POC) 0.2 mg/dL 0.2 - 1    Nitrites (UA POC) Negative Negative    Leukocyte esterase (UA POC) Negative Negative     I personally reviewed POC UA- UA negative. No signs of infection. No evidence of proteinuria       Assessment         ICD-10-CM ICD-9-CM    1. 34 weeks gestation of pregnancy  Z3A.34 V22.2 AMB POC URINALYSIS DIP STICK AUTO W/O MICRO   2. GDM, class A2  O24.419 648.80      V58.67    3. Obesity affecting pregnancy in third trimester  O99.213 649.13    4. Antepartum multigravida of advanced maternal age  O12.46 65.56 aspirin delayed-release 81 mg tablet         Plan   38 y.o.  34w0d LAURE 3/29/2021, by Ultrasound here for return OB visit     1. SIUP at 34w0d  -PNL:AB pos Ab neg, G/C neg, VZV immune, Rubella nonimmune,  RPR nr, HepB/HIV neg. Pap NIL. HgB 13.5. 1hr GTT >200.   GBS Bacteruria    -Genetic testing: Panorama low risk, carrier screen neg- baby boy -Immunizations: flu and Tdap today   -Ultrasound: anatomy scan 1/15 at 29w4d- EFW 49th%,  EDC 3/29/21-extra small post-axial (\"pinky side\")- digit at least on the left side hands-(FOB and another child have extra digits per mom). · Growth scan with MFM  · ASA refilled   · Follow up in 1 week    PREGNANCY CONCERNS  GDM: averages appear to be mostly at goal.  Some lower AM readings. discussed risks of uncontrolled glucose including but not limited to macrosomia, birth injury, increased need for , birth defects, miscarriage/fetal demise,  hypoglycemia, persistent diabetes after pregnancy. - discussed goal fasting and 2hr pp sugars (<90 and <120 respectively)   - discussed importance of low carb low sugar diet to help control sugars. - continue walking regimen.   - change NPH 10U AM, 16U at night. Late to care: UDS neg. SLIUP with  EGA of 29w4. As her LMP based dates would place her at 26w2, we recommend using her [place]based [address] from today of 3/29/21.     Extra Digit: noted on MFM scan from 1/15. Pt states her  and daughter also have an extra digit. NIPT low risk     h/o Abnormal pap: pap at IOB NIL      AMA: On ASA. Refill sent      Obesity: BMI ~40. Anatomy scan at 29w with EFW 49th%     BV:RESOLVED- noted on pap smear. Asymptomatic. Treated wit Flagyl      GBS Bacteruria: >100K on UCx from [employer] labs. -needs Abx at delivery      Rubella nonimmune- needs pp vaccination        BIRTH PLAN  · Continuity Provider: Dejah  · Pain mgmt. in labor:   · Feeding plan:   · Circ if male:   · Social: Denies Tobacco, EtoH or illict drugs. Orders Placed This Encounter    AMB POC URINALYSIS DIP STICK AUTO W/O MICRO    aspirin delayed-release 81 mg tablet     Sig: Take 1 Tab by mouth daily for 90 days.      Dispense:  90 Tab     Refill:  1     Labor precautions discussed, including: Regular painful contractions, lasting for greater than one hour, taking your breath away; any vaginal bleeding; any leakage of fluid; or absent or decreased fetal movement. Call M.D. on call if any of these symptoms or signs occur. I have discussed the diagnosis with the patient and the intended plan as seen in the above orders. The patient has received an after-visit summary and questions were answered concerning future plans. I have discussed medication side effects and warnings with the patient as well. Informed pt to return to the office or go to the ER if she experiences vaginal bleeding, vaginal discharge, leaking of fluid, pelvic cramping.     Pt seen and discussed with Dr. Key Chahal (attending physician)    Velasquez Bain DO  Family Medicine Resident

## 2021-02-21 NOTE — PROGRESS NOTES
Return OB Visit       Subjective:   Stoney [place] 43 y.o. Karen Rolling  LAUER: 3/29/2021, by Ultrasound  GA:  35w0d. Radha banegas interpretor used. LOF: no  Vaginal bleeding: no  Fetal movement (after 20 weeks): yes  Contractions: no    GDM - Pt was placed on insulin 21. Dose was adjusted 15 to 10U AM and 16U at night. Log as follows           Pt denies fever, chills, HA, vision disturbances, RUQ pain, chest pain, SOB, N/V/D, constipation, urinary problems, foul smelling vaginal discharge, LE Edema worse than usual.     OB History    Para Term  AB Living   4 3 3     3   SAB TAB Ectopic Molar Multiple Live Births             3      # Outcome Date GA Lbr Luc/2nd Weight Sex Delivery Anes PTL Lv   4 Current            3 Term 06/07/15 37w0d  8 lb (3.629 kg) F Vag-Spont   ANSON   2 Term 12 37w0d  8 lb (3.629 kg) M Vag-Spont   ANSON   1 Term 03 38w0d  7 lb (3.175 kg) M Vag-Spont   ANSON      Obstetric Comments   First 3 babies all born in [facility]        Allergies- reviewed:   No Known Allergies  Medications- reviewed:   Current Outpatient Medications   Medication Sig    aspirin delayed-release 81 mg tablet Take 1 Tab by mouth daily for 90 days.  insulin NPH (NOVOLIN N, HUMULIN N) 100 unit/mL injection Please administer 30 units of NPH in the morning and 10 units in the evening. (Patient taking differently: Please administer 10 units of NPH in the morning and 16 units in the evening.)    Insulin Needles, Disposable, 31 gauge x 5/16\" ndle Please administer 30 units of NPH in the morning and 10 units in the evening.  insulin syringe,safetyneedle (BD SafetyGlide Insulin Syringe) 0.5 mL 31 gauge x 15/64\" syrg 30 Units Every morning AND 10 Units every evening.  PNV with Ca No.65-Iron Poly-FA 60 mg iron-1 mg cap Take  by mouth. No current facility-administered medications for this visit.       Past Medical History- reviewed:  Past Medical History:   Diagnosis Date    Abnormal Papanicolaou smear of cervix     History of abnormal cervical Pap smear 2017     Past Surgical History- reviewed:   History reviewed. No pertinent surgical history.   Social History- reviewed:  Social History     Socioeconomic History    Marital status: SINGLE     Spouse name: Not on file    Number of children: Not on file    Years of education: Not on file    Highest education level: Not on file   Occupational History    Not on file   Social Needs    Financial resource strain: Not on file    Food insecurity     Worry: Not on file     Inability: Not on file    Transportation needs     Medical: Not on file     Non-medical: Not on file   Tobacco Use    Smoking status: Never Smoker    Smokeless tobacco: Never Used   Substance and Sexual Activity    Alcohol use: Never     Frequency: Never     Binge frequency: Never    Drug use: Never    Sexual activity: Yes     Partners: Male   Lifestyle    Physical activity     Days per week: Not on file     Minutes per session: Not on file    Stress: Not on file   Relationships    Social connections     Talks on phone: Not on file     Gets together: Not on file     Attends Jew service: Not on file     Active member of club or organization: Not on file     Attends meetings of clubs or organizations: Not on file     Relationship status: Not on file    Intimate partner violence     Fear of current or ex partner: Not on file     Emotionally abused: Not on file     Physically abused: Not on file     Forced sexual activity: Not on file   Other Topics Concern    Not on file   Social History Narrative    Patient used to work in a [address] [place] [address]- reviewed:   Immunization History   Administered Date(s) Administered    Influenza Vaccine [employer]) PF (>6 Mo Flulaval, Fluarix, and >3 Yrs Afluria, Fluzone 48568) 01/20/2021    Tdap 01/22/2021       Objective:     Visit Vitals  /69 (BP 1 Location: Left upper arm, BP Patient Position: Sitting, BP Cuff Size: Adult)   Pulse 99   Temp 97.1 °F (36.2 °C) (Temporal)   Resp 16   Ht 5' 1.5\" (1.562 m)   Wt 223 lb 12.8 oz (101.5 kg)   LMP 07/15/2020 (Approximate)   SpO2 98%   BMI 41.60 kg/m²       Physical Exam:  GENERAL APPEARANCE: alert, well appearing, in no apparent distress  ABDOMEN: gravid, Fundal height 44 FHT present at 130  bpm  PSYCH: normal mood and affect     Labs  Recent Results (from the past 12 hour(s))   AMB POC URINALYSIS DIP STICK AUTO W/O MICRO    Collection Time: 21  9:02 AM   Result Value Ref Range    Color (UA POC) Yellow     Clarity (UA POC) Slightly Cloudy     Glucose (UA POC) Negative Negative    Bilirubin (UA POC) Negative Negative    Ketones (UA POC) Negative Negative    Specific gravity (UA POC) 1.025 1.001 - 1.035    Blood (UA POC) 1+ Negative    pH (UA POC) 5.0 4.6 - 8.0    Protein (UA POC) Negative Negative    Urobilinogen (UA POC) 0.2 mg/dL 0.2 - 1    Nitrites (UA POC) Negative Negative    Leukocyte esterase (UA POC) Negative Negative   I personally reviewed POC UA- UA negative. No signs of infection. No evidence of proteinuria         Assessment         ICD-10-CM ICD-9-CM    1. 35 weeks gestation of pregnancy  Z3A.35 V22.2 AMB POC URINALYSIS DIP STICK AUTO W/O MICRO   2. Antepartum multigravida of advanced maternal age  O12.46 65.56 aspirin delayed-release 81 mg tablet         Plan   43 y.o.  35w0d LAURE 3/29/2021, by Ultrasound here for return OB visit     1. SIUP at 35w0d  -PNL:AB pos Ab neg, G/C neg, VZV immune, Rubella nonimmune,  RPR nr, HepB/HIV neg. Pap NIL. HgB 13.5. 1hr GTT >200. GBS Bacteruria    -Genetic testing: Panorama low risk, carrier screen neg- baby boy   -Immunizations: flu and Tdap today   -Ultrasound: anatomy scan 1/15 at 29w4d- EFW 49th%,  EDC 3/29/21-extra small post-axial (\"pinky side\")- digit at least on the left side hands-(FOB and another child have extra digits per mom).      · Growth scan with MFM scheduled 3/1 at 9AM  · Follow up in 1 week    PREGNANCY CONCERNS  GDM: averages appear at goal.  discussed risks of uncontrolled glucose including but not limited to macrosomia, birth injury, increased need for , birth defects, miscarriage/fetal demise,  hypoglycemia, persistent diabetes after pregnancy. - discussed goal fasting and 2hr pp sugars (<90 and <120 respectively)   - discussed importance of low carb low sugar diet to help control sugars. - continue walking regimen. - continue NPH 10U AM, 16U at night. Late to care: UDS neg. SLIUP with  EGA of 29w4. As her LMP based dates would place her at 26w2, we recommend using her -based [facility] from today of 3/29/21.     GBS Bacteruria: >100K on UCx from [employer] labs. -needs Abx at delivery      Rubella nonimmune- needs pp vaccination      Extra Digit: noted on MFM scan from 1/15. Pt states her  and daughter also have an extra digit. NIPT low risk     h/o Abnormal pap: pap at IOB NIL      AMA: On ASA. Refill sent      Obesity: BMI ~40. Anatomy scan at 29w with EFW 49th%     BV:RESOLVED- noted on pap smear. Asymptomatic. Treated wit Flagyl          BIRTH PLAN  · Continuity Provider: Dejah  · Pain mgmt. in labor:   · Feeding plan: breast and formula   · Circ if male:   · Family planning: wants IUD- ordered   · Social: Denies Tobacco, EtoH or illict drugs. Orders Placed This Encounter    AMB POC URINALYSIS DIP STICK AUTO W/O MICRO    aspirin delayed-release 81 mg tablet     Sig: Take 1 Tab by mouth daily for 90 days. Dispense:  90 Tab     Refill:  1     Labor precautions discussed, including: Regular painful contractions, lasting for greater than one hour, taking your breath away; any vaginal bleeding; any leakage of fluid; or absent or decreased fetal movement. Call M.D. on call if any of these symptoms or signs occur. I have discussed the diagnosis with the patient and the intended plan as seen in the above orders.   The patient has received an after-visit summary and questions were answered concerning future plans. I have discussed medication side effects and warnings with the patient as well. Informed pt to return to the office or go to the ER if she experiences vaginal bleeding, vaginal discharge, leaking of fluid, pelvic cramping.     Pt seen and discussed with Dr. Tyler Sims (attending physician)    Gokul Pickett DO  Family Medicine Resident

## 2021-02-21 NOTE — PATIENT INSTRUCTIONS
Semanas 34 a 36 de lara embarazo: Instrucciones de cuidado  Weeks 34 to 36 of Your Pregnancy: Care Instructions  Instrucciones de cuidado    A estas [place], lara bebé y lara abdomen habrán crecido considerablemente. Brynn es Alpha de yared a lizett. Los pulmones de lara bebé están brynn listos para respirar aire. Los huesos de la jasmin de lara bebé ahora son bastante firmes bladimir para protegerla zeb se mantienen lo suficientemente blandos bladimir para atravesar el canal de [place]. Es posible que sienta entusiasmo, nancy, ansiedad o miedo. Quizá se pregunte cómo se dará cuenta de si está en trabajo de parto o qué esperar en nito momento. Trate de ser flexible con madiha expectativas respecto del nacimiento. Dado que cada nacimiento es diferente, no hay manera de saber exactamente cómo será lara parto. Esta hoja de cuidados la ayudará a saber qué esperar y cómo prepararse. Le podría facilitar el parto. Si todavía no le brady aplicado la vacuna Tdap (tétanos, difteria y tos [place]) stephie moshe [place], hable con lara médico acerca de aplicársela. [place] a proteger a lara recién nacido contra la infección por tos ferina. En la semana 36, a la mayoría de las mujeres se les hace debby prueba de estreptococos del yovanny B (GBS, por madiha siglas en inglés). Los estreptococos del yovanny B son bacterias comunes que pueden vivir en la vagina y el recto. Pueden hacer que lara bebé se enferme después del parto. Si el resultado es positivo, usted recibirá antibióticos stephie el trabajo de [place]. Los medicamentos evitarán que lara bebé contraiga las bacterias. La atención de seguimiento es debby parte clave de lara tratamiento y seguridad. Asegúrese de hacer y acudir a todas las citas, y llame a lara médico si está teniendo problemas. También es debby buena idea saber los resultados de madiha exámenes y mantener debby lista de los medicamentos que ady. ¿Cómo puede cuidarse en el hogar?   Aprenda sobre las alternativas para aliviar el dolor  · El dolor se [place] de modo diferente en cada gatito. Hable con lara médico acerca de madiha sentimientos sobre el dolor. · Puede elegir entre varias formas de aliviar el dolor. Estas incluyen medicamentos o técnicas de respiración, así bladimir medidas para estar cómoda. Usted puede utilizar más de [place] opción. · Si elige un analgésico (medicamento para el dolor) stephie el trabajo de [place], hable con lara médico acerca de madiha opciones. Algunos medicamentos reducen la ansiedad y [facility] a aliviar parte del dolor. Otros adormecen la parte inferior del cuerpo para que no sienta dolor. · Asegúrese de decirle a lara médico acerca de lara elección de analgésico antes de empezar el trabajo de parto o muy temprano en el [place]. Es posible que pueda cambiar de parecer a medida que avanza el [place]. · Mary vez se duerme a debby gatito con medicamentos administrados a través de debby máscara o por vía intravenosa (IV). Trabajo de parto y [place]  · La primera etapa del [place] de parto se divide en jarrell fases: Kaye Maxim y de transición. ? La mayoría de las mujeres experimentan la fase latente del [place] de parto en madiha hogares. Usted puede [employer] o descansar, comer refrigerios livianos, beber líquidos titus y comenzar a contar las contracciones. ? Cuando advierta que se le vuelve difícil hablar stephie debby contracción, es posible que esté por pasar a la fase activa. Stephie la fase [place], debería ir al hospital si no está allí aún. ? Usted está en la fase activa cuando tiene contracciones cada 3 o 4 minutos y saleem alrededor de 60 segundos. El seb uterino comienza a abrirse con más rapidez.  ? Si se le rompe la karla, las contracciones serán más intensas y más frecuentes. ? Stephie la fase de transición, el seb uterino se estira y las contracciones se producen con [place]. ? Dorcus Peat tenga deseos de pujar, sin embargo es posible que el seb uterino aún no esté preparado.  El [employer] dirá cuándo pujar.  · La segunda etapa comienza cuando el seb uterino se abre por completo y usted está lista para pujar. ? Las contracciones son muy intensas a fin de empujar al bebé por el canal de parto. ? Sentirá la necesidad de pujar. Podría sentir bladimir si tuviera ganas de evacuar el intestino. ? Effie Adolfo entrenen a [employer]. Estas contracciones serán muy intensas zeb no ocurrirán con tanta frecuencia. Puede descansar un poco entre contracciones. ? Es posible que esté sensible e irritable. Es posible que no se dé cuenta de lo que pasa a lara alrededor. ? Un último esfuerzo y habrá nacido lara bebé. · La tercera etapa ocurre cuando con unas cuantas contracciones más se expulsa la placenta. [place] puede durar 30 minutos o menos. · La cuarta etapa es la de recuperación. Es posible que se sienta abrumada con las emociones y exhausta zeb alerta. Christel es un buen momento para comenzar el amamantamiento. ¿Dónde puede encontrar más información en inglés? Vaya a [email or web address]  Jolly Worrell [id number] en la búsqueda para aprender más acerca de \"Semanas 34 a 39 de lara embarazo: Instrucciones de cuidado. \"  Revisado: 11 febrero, 2020               Versión del contenido: 12.6  © 7383-7319 Healthwise, Incorporated. Las instrucciones de cuidado fueron adaptadas bajo licencia por Good Help Connections (which disclaims liability or warranty for this information). Si usted tiene [place] afección médica o sobre estas instrucciones, siempre pregunte a lara profesional de kiran. Healthwise, Incorporated niega toda garantía o responsabilidad por lara uso de esta información. Aprenda acerca de las pautas alimentarias para diabetes  Learning About Diabetes Food Guidelines  Instrucciones de cuidado    La planificación de las comidas es importante para el manejo de la diabetes. Ayuda a mantener el azúcar en la maria dolores en el nivel ideal (que usted fija con lara médico).  Usted no tiene que comer alimentos especiales. Puede comer lo mismo que lara tiffany, incluso dulces de vez en cuando. Amna debe prestar atención a la cantidad y la frecuencia con que come ciertos alimentos. Vikas vez desee colaborar con un dietista o educador de diabetes certificado (CDE, por madiha siglas en inglés) para que le ayuden a planificar las comidas y los refrigerios. Un dietista o CDE también puede ayudarle a bajar de peso si nito es eladio de madiha objetivos. ¿Qué debería saber acerca de ingerir carbohidratos? Manejar la cantidad de carbohidratos que ingiere es debby parte importante de la alimentación saludable cuando tiene diabetes. Los carbohidratos se encuentran en muchos alimentos. · Sepa qué alimentos contienen carbohidratos. Y aprenda qué cantidad de carbohidratos contienen los diferentes alimentos. ? El pan, los cereales, la pasta y el arroz tienen aproximadamente 15 gramos de carbohidratos por porción. Debby porción equivale a 1 rebanada de pan (1 onza o 28 g), ½ taza de cereal cocido o 1/3 de taza de pasta o arroz cocidos. ? Las frutas tienen 15 gramos de carbohidratos por porción. Ananya Tasha porción es 1 fruta fresca pequeña, bladimir debby [place] oj o debby naranja; ½ banana (plátano); ½ taza de fruta cocida o enlatada; ½ taza de jugo de fruta; 1 taza de melón o frambuesas; o 2 cucharadas de frutas secas. ? Nolon Celi y el yogur sin azúcar agregado tienen 15 gramos de carbohidratos por porción. Ananya Tasha porción es 1 taza de [place] o 2/3 taza de yogur sin azúcar agregado. ? Las verduras con almidón tienen 15 gramos de carbohidratos por porción. Debby porción es ½ taza de puré de papa o camote (batata, boniato); 1 taza de calabacín; ½ papa horneada pequeña; ½ taza de frijoles cocidos; o ½ taza de maíz (elote) o arvejas (chícharos) cocidos. · Aprenda cuántos carbohidratos debe consumir cada día y en cada comida. Un dietista o CDE le puede enseñar cómo llevar la cuenta de los carbohidratos que consume.  A esto se le llama recuento de carbohidratos. · Si no está seguro de cómo [employer] gramos de carbohidratos, utilice el Método del [place] para planificar las comidas. Es debby Almeta Janice y rápida de asegurarse de que consuma comidas equilibradas. También le ayuda a distribuir los carbohidratos stephie el día. ? Divida el plato por tipo de alimento. Llene medio plato con verduras sin almidón, ponga carne u otras proteínas en debby cuarta parte del plato y granos o verduras con almidón en el último cuarto del plato. A esto puede agregarle un pequeño pedazo de fruta y 3 taza de [place] o yogur, según la cantidad de carbohidratos que deba consumir en debby comida. · Trate de comer aproximadamente la misma cantidad de carbohidratos en cada comida. No \"reserve\" lara cantidad diaria de carbohidratos para consumirlos en debby diamond comida. · Las proteínas contienen muy pocos o nada de carbohidratos por porción. Los ejemplos de proteínas incluyen carne de res, Dbebie heights, Jt, [place], [place], tofu, Magda-barre, requesón (\"cottage cheese\") y la mantequilla de cacahuate [place]). Debby porción de carne son 3 onzas (85 g), lo cual es aproximadamente del tamaño de debby baraja de naipes. Los ejemplos de porciones de sustitutos de la carne (equivalente a 1 onza o 28 g de carne) son 1/4 de taza de requesón, 1 huevo, 1 cucharada de [place] de cacahuate y ½ taza de tofu. ¿Cómo puede comer fuera y aún así comer de modo saludable? · Aprenda a calcular los tamaños de las porciones de alimentos que contienen carbohidratos. Si mide la comida en casa, será más fácil calcular la cantidad en debby porción de comida de restaurante. · Si el platillo que pide contiene demasiados carbohidratos (bladimir dennis, maíz o frijoles al horno), pida un alimento bajo en carbohidratos en lara lugar. Pida debby ensalada o verduras. · Si [place] insulina, revise lara azúcar en la maria dolores antes y después de comer fuera para ayudarle a planear cuánto comer en el futuro.   · Si usted come más carbohidratos de lo planeado en debby comida, dé un paseo o marlene otro tipo de ejercicio. [place] ayudará a reducir el azúcar en la maria dolores. ¿Qué más debería saber? · Limite las grasas saturadas, bladimir la grasa de la carne y productos lácteos. Esta es debby opción saludable, porque las personas que tienen diabetes tienen un mayor riesgo de enfermedades del corazón. Así que elija brar magros de carne y productos lácteos descremados o semidescremados. Utilice aceite de castro o de canola en lugar de mantequilla o manteca al cocinar. · No se salte comidas. Burks nivel de azúcar en la maria dolores puede bajar demasiado si usted se salta comidas y ady insulina o ciertos medicamentos para la diabetes. · Consulte con burks médico antes de beber alcohol. El alcohol puede hacer que burks azúcar en la maria dolores baje demasiado. El alcohol también puede causar debby reacción adversa si usted ady ciertos medicamentos para la diabetes. La atención de seguimiento es debby parte clave de burks tratamiento y seguridad. Asegúrese de hacer y acudir a todas las citas, y llame a burks médico si está teniendo problemas. También es debby buena idea saber los resultados de madiha exámenes y mantener debby lista de los medicamentos que ady. ¿Dónde puede encontrar más información en inglés? Mounika Chambers a [email or web address]  Leonila [id number] en la búsqueda para aprender más acerca de \"Aprenda acerca de las pautas alimentarias para diabetes. \"  Revisado: 20 estevan, 7401               AQOUNZF del contenido: 12.6  © 3769-7029 Healthwise, Incorporated. Las instrucciones de cuidado fueron adaptadas bajo licencia por Good Help Connections (which disclaims liability or warranty for this information). Si usted tiene [place] afección médica o sobre estas instrucciones, siempre pregunte a burks profesional de kiran. [facility], Incorporated niega toda garantía o responsabilidad por burks uso de esta información. Precauciones en el embarazo:  Instrucciones de cuidado  Pregnancy Precautions: Care Instructions  Instrucciones de cuidado    No hay debby manera samaniego de prevenir el trabajo de parto antes de la fecha esperada (trabajo de parto prematuro) o de prevenir la mayoría de otros problemas en el [facility]. Amna hay cosas que puede hacer para aumentar las probabilidades de tener un embarazo saludable. Vaya a madiha citas, siga los consejos de lara médico y cuídese. Coma fidelina y marlene ejercicio (si lara médico lo permite). Y asegúrese de tree abundante agua. La atención de seguimiento es debby parte clave de lara tratamiento y seguridad. Asegúrese de hacer y acudir a todas las citas, y llame a lara médico si está teniendo problemas. También es debby buena idea saber los resultados de madiha exámenes y mantener debby lista de los medicamentos que ady. ¿Cómo puede cuidarse en el hogar? · Asegúrese de asistir a las citas prenatales. Lara médico le tomará la presión arterial en cada consulta. Lara médico también comprobará si tiene proteínas en lara orina. Tanto la presión arterial tigre bladimir la presencia de proteínas en la orina son señales de preeclampsia. Esta afección puede ser peligrosa tanto para usted bladimir para lara bebé. · Ny abundantes líquidos, suficientes para que lara orina sea de color amarillo adelina o transparente bladimir el agua. La deshidratación puede causar contracciones. Si tiene [employer], el corazón o el hígado y tiene que [place] líquidos, hable con lara médico antes de aumentar lara consumo. · Notifique a lara médico de inmediato si presenta cualquier síntoma de infección, tales bladimir:  ? Ardor cuando orina. ? Flujo con mal olor de la vagina. ? Comezón en la vagina. ? Clement Glow sin explicación. ? Dolor o sensibilidad inusual en el útero o la parte baja del abdomen. · Aliméntese en forma equilibrada. Incluya muchos alimentos que tien ricos en calcio y ming. ? Entre los alimentos ricos en calcio se incluyen la [place], el queso, el yogur, Darrelyn Cagey y el brócoli. ?  [employer] alimentos ricos en ming se incluyen las yue [place]udson, los mariscos, las aves, los [place], los frijoles, las uvas pasas, el pan de grano integral y las verduras de hojas verdes. · No fume. Si necesita ayuda para dejar de fumar, hable con lara médico sobre programas y medicamentos para dejar de fumar. Estos pueden aumentar madiha probabilidades de dejar el hábito para siempre. · No ju alcohol ni use drogas ilegales. · Siga las instrucciones de lara médico acerca de la [place]. Lara médico le dirá cuánto ejercicio puede hacer. · Pregúntele a lara médico si puede tener [employer]. Si usted está en riesgo de tener trabajo de [place], lara médico podría pedirle que no tenga relaciones sexuales. · [place] precauciones para prevenir las caídas. Benjamin el embarazo las articulaciones están más sueltas y se tiene menos equilibrio. Los deportes tales bladimir el ciclismo, el esquí o el patinaje en línea pueden aumentar el riesgo de caídas. Y no monte a lizbeth, sandy en motocicleta, marlene clavados, marlene esquí acuático, bucee, ni salte en paracaídas mientras está embarazada. · Evite calentarse demasiado. No use saunas ni bañeras de hidromasaje. Evite la exposición al sol en climas calientes por mucho tiempo. [place] acetaminofén (Tylenol) para bajar debby fiebre tigre. · No tome medicamentos de venta leyla, productos herbarios ni suplementos sin hablar oh con lara médico o farmacéutico.  ¿Cuándo debe pedir ayuda? Llame al 911 en cualquier momento que considere que necesita atención de [place]. Por ejemplo, llame si:    · Se desmayó (perdió el conocimiento).     · Tiene convulsiones.     · Tiene sangrado vaginal intenso.     · Tiene dolor intenso en el abdomen o la pelvis.     · Le sale abundante líquido o gotea de la vagina y sabe o nila que el cordón umbilical se está saliendo a lara vagina. Si esto sucede, arrodíllese de inmediato, de oswald forma que madiha nalgas estén más altas que lara jasmin.  [place] disminuirá la presión sobre el cordón umbilical hasta que llegue la [facility]. Llame a lara médico ahora mismo o busque atención médica inmediata si:    · Tiene señales de preeclampsia, bladimir:  ? Hinchazón repentina de la dickson, las kris o los pies. ? Nuevos problemas de visión (bladimir oscurecimiento, bartolo borroso o bartolo puntos). ? Dolor de jasmin intenso.     · Tiene cualquier sangrado vaginal.     · Tiene dolor o cólicos abdominales.     · Tiene fiebre.     · Rc Rave tenido contracciones regulares (con o sin dolor) por Ewelina Baez. [place] significa que tiene 8 o más contracciones en 1 hora o que tiene 4 contracciones o más en 20 minutos después de [place] de posición y tree líquidos.     · Tiene debby pérdida repentina de líquido por la vagina.     · Tiene dolor en la parte baja de la espalda o presión en la pelvis que no desaparece.     · Nota que lara bebé ha dejado de moverse o se mueve mucho menos de lo normal.   Preste especial atención a los cambios en lara kiran y asegúrese de comunicarse con lara médico si tiene algún problema. ¿Dónde puede encontrar más información en inglés? Debby Husseinnel a [email or web address]  Leonila [id number] en la búsqueda para aprender más acerca de \"Precauciones en el embarazo: Instrucciones de cuidado. \"  Revisado: 11 febrero, 2020               Versión del contenido: 12.6  © 7255-6618 Healthwise, Incorporated. Las instrucciones de cuidado fueron adaptadas bajo licencia por Good Help Connections (which disclaims liability or warranty for this information). Si usted tiene [place] afección médica o sobre estas instrucciones, siempre pregunte a lara profesional de kiran. Healthwise, Incorporated niega toda garantía o responsabilidad por lara uso de esta información. Diabetes gestacional: Instrucciones de cuidado  Gestational Diabetes: Care Instructions  Instrucciones de cuidado    La diabetes gestacional se puede desarrollar stephie el [place] Guer[place].  Cuando tiene [place] [employer], la [facility] organismo no puede mantener el azúcar en la maria dolores dentro de los límites normales. Si no controla el nivel de azúcar en la maria dolores, el bebé puede crecer demasiado y tener problemas yahir después de nacer, bladimir niveles bajos de azúcar en la [place]. En la [employer], la diabetes gestacional desaparece después de que haya nacido el bebé. Amna si usted tiene diabetes gestacional, tiene un mayor riesgo de tenerla en un embarazo futuro y de llegar a tener diabetes tipo 2. Para detectar la diabetes, es posible que le joe edbby prueba de seguimiento de tolerancia a la glucosa entre 4 y 15 semanas después del nacimiento de lara bebé o después de que deje de amamantar a lara bebé. Si los resultados de esta prueba son normales, los expertos recomiendan que se vuelva a hacer pruebas para detectar la diabetes de tipo 2 por lo menos cada 3 años. Es posible que pueda controlar el nivel de azúcar en la maria dolores si sigue debby dieta saludable y hace ejercicio con regularidad. Además, es posible que pueda evitar llegar a tener diabetes tipo 2 en el futuro si mantiene un peso saludable. Si la dieta y el ejercicio no disminuyen el nivel de azúcar en la maria dolores lo suficiente, podría necesitar insulina o medicamentos para la diabetes. La atención de seguimiento es debby parte clave de lara tratamiento y seguridad. Asegúrese de hacer y acudir a todas las citas, y llame a lara médico si está teniendo problemas. También es debby buena idea saber los resultados de madiha exámenes y mantener debby lista de los medicamentos que ady. ¿Cómo puede cuidarse en el hogar? · Si lara médico le receta insulina, adminístresela a diario según las indicaciones. Alra médico le dirá cómo y cuándo administrarse la insulina. · Revísese el nivel de azúcar en la maria dolores según las indicaciones. Lara médico le dirá cómo y cuándo revisar el azúcar en la maria dolores. · Vigile el movimiento del bebé según las indicaciones.  Es posible que el médico le pida que informe la cantidad de veces, en debby hora, que siente a lara bebé moverse. · Siga debby [place]. Vikas vez desee consultar a un dietista registrado. Podrá enseñarle cómo distribuir los carbohidratos a lo jaiden del día. [place] puede evitar que el nivel de azúcar en la maria dolores suba rápidamente después de las comidas. Si se administra insulina, también puede aprender a hacer coincidir la cantidad de insulina que se administra en las comidas con la cantidad de carbohidratos que consume. · No amilcar dieta para perder peso. No es saludable cuando está embarazada. · Amilcar ejercicio todos los aarti. [place] puede ayudarle a bajar el nivel de azúcar en la maria dolores. Caminar y nadar son Darl Juni opciones. Sin embargo, no amilcar ejercicio sin hablar antes con lara médico.  ¿Cuándo debe pedir ayuda? Llame al 911 en cualquier momento que considere que necesita atención de [place]. Por ejemplo, llame si:    · Se desmayó (perdió el conocimiento) o se siente muy somnolienta o confusa repentinamente. (Es posible que tenga un nivel de azúcar en la maria dolores muy bajo).     · Tiene síntomas de alto nivel de azúcar en la maria dolores, tales bladimir:  ? Jj Lefort. ? Dificultad para permanecer despierta o ser despertada. ? Respiración rápida y profunda. ? Aliento que huele a fruta. ? Dolor abdominal, falta de apetito y vómito. ? Sentirse confusa. Llame a lara médico ahora mismo o busque atención médica inmediata si:    · Está enferma y no puede controlar lara nivel de azúcar en la maria dolores.     · Ha estado vomitando o ha tenido diarrea stephie más de 6 horas.     · Lara nivel de azúcar en la maria dolores permanece a un nivel más alto del que lara médico ha establecido para usted.     · Tiene síntomas de bajo nivel de azúcar en la maria dolores, tales bladimir:  ? Sudoración. ? Sentirse nerviosa, temblorosa y débil. ? Hambre extrema y náuseas leves. ? Cassandra Bathe y dolor de [place]. ? Jj Lefort. ? Confusión.    Preste especial atención a los cambios en lara kiran y asegúrese de comunicarse con lara médico si:    · Tiene dificultades para saber cuándo lara nivel de azúcar en la maria dolores está bajo.     · Tiene problemas para mantener lara nivel de azúcar en la maria dolores dentro de los límites ideales.     · Frecuentemente tiene problemas para controlar lara nivel de azúcar en la maria dolores. ¿Dónde puede encontrar más información en inglés? Vaya a [email or web address]  Escriba A800 en la búsqueda para aprender más acerca de \"Diabetes gestacional: Instrucciones de cuidado. \"  Revisado: 20 estevan, 8856               KVUWQHB del contenido: 12.6  © 9381-2207 Healthwise, Incorporated. Las instrucciones de cuidado fueron adaptadas bajo licencia por Good Help Connections (which disclaims liability or warranty for this information). Si usted tiene [place] [employer] afección médica o sobre estas instrucciones, siempre pregunte a lara profesional de kiran. VISENZE, [employer] niega toda garantía o responsabilidad por lara uso de esta información.

## 2021-02-22 ENCOUNTER — ROUTINE PRENATAL (OUTPATIENT)
Dept: FAMILY MEDICINE CLINIC | Age: 36
End: 2021-02-22
Payer: SUBSIDIZED

## 2021-02-22 VITALS
HEART RATE: 99 BPM | OXYGEN SATURATION: 98 % | SYSTOLIC BLOOD PRESSURE: 101 MMHG | HEIGHT: 62 IN | WEIGHT: 223.8 LBS | TEMPERATURE: 97.1 F | RESPIRATION RATE: 16 BRPM | BODY MASS INDEX: 41.18 KG/M2 | DIASTOLIC BLOOD PRESSURE: 69 MMHG

## 2021-02-22 DIAGNOSIS — Z3A.35 35 WEEKS GESTATION OF PREGNANCY: Primary | ICD-10-CM

## 2021-02-22 DIAGNOSIS — O09.529 ANTEPARTUM MULTIGRAVIDA OF ADVANCED MATERNAL AGE: ICD-10-CM

## 2021-02-22 LAB
BILIRUB UR QL STRIP: NEGATIVE
GLUCOSE UR-MCNC: NEGATIVE MG/DL
KETONES P FAST UR STRIP-MCNC: NEGATIVE MG/DL
PH UR STRIP: 5 [PH] (ref 4.6–8)
PROT UR QL STRIP: NEGATIVE
SP GR UR STRIP: 1.02 (ref 1–1.03)
UA UROBILINOGEN AMB POC: NORMAL (ref 0.2–1)
URINALYSIS CLARITY POC: NORMAL
URINALYSIS COLOR POC: YELLOW
URINE BLOOD POC: NORMAL
URINE LEUKOCYTES POC: NEGATIVE
URINE NITRITES POC: NEGATIVE

## 2021-02-22 PROCEDURE — 0502F SUBSEQUENT PRENATAL CARE: CPT | Performed by: STUDENT IN AN ORGANIZED HEALTH CARE EDUCATION/TRAINING PROGRAM

## 2021-02-22 RX ORDER — ASPIRIN 81 MG/1
81 TABLET ORAL DAILY
Qty: 90 TAB | Refills: 1 | Status: SHIPPED | OUTPATIENT
Start: 2021-02-22 | End: 2021-03-24

## 2021-02-22 NOTE — PROGRESS NOTES
I reviewed with the resident the medical history and the resident's findings on the physical examination. I discussed with the resident the patient's diagnosis and concur with the plan. 36yo [id number] @ 35w0d by 29wk scan   1. IUP: RH pos, do not repeat GBS testing   2. Late to care  3. Obesity: counseled on appropriate weight gain  4. AMA: ASA   5. Extra digit seen on baby: NIPT wnl  6. Rubella NI: offer vaccine PP   7. GBS bacteriuria: abx at delivery   8. GDM: log better on NPH 10/16. Discussed risks of uncontrolled glucose including but not limited to macrosomia, birth injury, increased need for , birth defects, miscarriage/fetal demise,  hypoglycemia, persistent diabetes after pregnancy. Growth scan on 3/1.       Referred to UBB

## 2021-02-22 NOTE — PROGRESS NOTES
Chief Complaint   Patient presents with    Routine Prenatal Visit     G4: P3; 28 w; Patient presents for routine prenatal care. no vaginal bleeding, no loss of fluid, no contractions or pain; positive fetal movement. Vitals:    02/22/21 0904   BP: 101/69   BP 1 Location: Left upper arm   BP Patient Position: Sitting   BP Cuff Size: Adult   Pulse: 99   Resp: 16   Temp: 97.1 °F (36.2 °C)   TempSrc: Temporal   SpO2: 98%   Weight: 223 lb 12.8 oz (101.5 kg)   Height: 5' 1.5\" (1.562 m)       PREM Copeland Self) scheduled the patient for an appointment with [facility] for elevated pulse for 02/22/2021 @ 1:30 p.m. @ the [facility] location [place]. 120; I also notified the patient fees for todays visit is $150; half is due today. 1. Have you been to the ER, urgent care clinic since your last visit? Hospitalized since your last visit? No     2. Have you seen or consulted any other health care providers outside of the [address] [place] [address] since your last visit? Include any pap smears or colon screening.  No

## 2021-02-28 NOTE — PROGRESS NOTES
Return OB Visit     Juan Vee interpretor used due to language barrier   Subjective:   Suly Lance 43 y.o.   LAURE: 3/29/2021, by Ultrasound  GA:  36w0d. The patient has no concerns. Blood sugars: Fasting all <95, 2hr PP 80% <120. Log scanned into media. LOF: none  Vaginal bleeding: none  Fetal movement (after 20 weeks): Yes  Contractions: sporadic, infrequent. Only 1 or 2 total.      Allergies- reviewed:   No Known Allergies  Medications- reviewed:   Current Outpatient Medications   Medication Sig    aspirin delayed-release 81 mg tablet Take 1 Tab by mouth daily for 90 days.  insulin NPH (NOVOLIN N, HUMULIN N) 100 unit/mL injection Please administer 30 units of NPH in the morning and 10 units in the evening. (Patient taking differently: Please administer 10 units of NPH in the morning and 16 units in the evening.)    Insulin Needles, Disposable, 31 gauge x 5/16\" ndle Please administer 30 units of NPH in the morning and 10 units in the evening.  insulin syringe,safetyneedle (BD SafetyGlide Insulin Syringe) 0.5 mL 31 gauge x 15/64\" syrg 30 Units Every morning AND 10 Units every evening.  PNV with Ca No.65-Iron Poly-FA 60 mg iron-1 mg cap Take  by mouth. No current facility-administered medications for this visit. Past Medical History- reviewed:  Past Medical History:   Diagnosis Date    Abnormal Papanicolaou smear of cervix     History of abnormal cervical Pap smear      Past Surgical History- reviewed:   History reviewed. No pertinent surgical history.   Social History- reviewed:  Social History     Socioeconomic History    Marital status: SINGLE     Spouse name: Not on file    Number of children: Not on file    Years of education: Not on file    Highest education level: Not on file   Occupational History    Not on file   Social Needs    Financial resource strain: Not on file    Food insecurity     Worry: Not on file     Inability: Not on file   [employer] needs     Medical: Not on file     Non-medical: Not on file   Tobacco Use    Smoking status: Never Smoker    Smokeless tobacco: Never Used   Substance and Sexual Activity    Alcohol use: Never     Frequency: Never     Binge frequency: Never    Drug use: Never    Sexual activity: Yes     Partners: Male   Lifestyle    Physical activity     Days per week: Not on file     Minutes per session: Not on file    Stress: Not on file   Relationships    Social connections     Talks on phone: Not on file     Gets together: Not on file     Attends Jehovah's witness service: Not on file     Active member of club or organization: Not on file     Attends meetings of clubs or organizations: Not on file     Relationship status: Not on file    Intimate partner violence     Fear of current or ex partner: Not on file     Emotionally abused: Not on file     Physically abused: Not on file     Forced sexual activity: Not on file   Other Topics Concern    Not on file   Social History Narrative    Patient used to work in a [address] [place] [address]- reviewed:   Immunization History   Administered Date(s) Administered    Influenza Vaccine (Quad) PF (>6 Mo Flulaval, Fluarix, and >3 Yrs Sagar Brisk [id number]) 01/20/2021    Tdap 01/22/2021       Objective:     Visit Vitals  /73 (BP 1 Location: Right upper arm, BP Patient Position: Sitting)   Pulse 92   Temp 97.6 °F (36.4 °C) (Temporal)   Resp 18   Ht 5' 1.5\" (1.562 m)   Wt 226 lb 3.2 oz (102.6 kg)   LMP 07/15/2020 (Approximate)   SpO2 98%   BMI 42.05 kg/m²       Physical Exam:  GENERAL APPEARANCE: alert, well appearing, in no apparent distress  ABDOMEN: gravid, fundal height 44 cm, FHT present at 130s bpm  PSYCH: normal mood and affect    SVE: 1/20/-3, chaperoned by Paige Lopez LPN     Labs  Recent Results (from the past 12 hour(s))   AMB POC URINALYSIS DIP STICK AUTO W/O MICRO    Collection Time: 03/01/21 11:17 AM   Result Value Ref Range    Color (UA POC) Yellow     Clarity (UA POC) Slightly Cloudy     Glucose (UA POC) Negative Negative    Bilirubin (UA POC) Negative Negative    Ketones (UA POC) Negative Negative    Specific gravity (UA POC) 1.020 1.001 - 1.035    Blood (UA POC) Trace Negative    pH (UA POC) 6.5 4.6 - 8.0    Protein (UA POC) Negative Negative    Urobilinogen (UA POC) 0.2 mg/dL 0.2 - 1    Nitrites (UA POC) Negative Negative    Leukocyte esterase (UA POC) Trace Negative         Assessment         ICD-10-CM ICD-9-CM    1. Pregnant and not yet delivered in third trimester  Z34.93 V22.1 AMB POC URINALYSIS DIP STICK AUTO W/O MICRO   2. GBS bacteriuria  R82.71 599.0      041.02    3. GDM, class A2  O24.419 648.80      V58.67    4. Multigravida of advanced maternal age in third trimester  O09.523 65.56          Plan   43 y.o.  36w0d LAURE 3/29/2021, by 29wk Ultrasound not c/w LMP here for return OB visit     1. SIUP   -PNL:AB pos Ab neg, G/C neg, VZV immune, Rubella nonimmune,  RPR nr, HepB/HIV neg. Pap NIL.  HgB 13.5. 1hr GTT >200. GBS Bacteruria    -Genetic testing: Panorama low risk, carrier screen neg- baby boy   -S/p flu and tdap   -Ultrasound: anatomy scan 1/15 at 29w4d- EFW 49th%, [facility] 3/29/21-extra small post-axial (\"pinky side\")- digit at least on the left side hands-(FOB and another child have extra digits per mom). 2. GDM: Reviewed and at goal. discussed risks of uncontrolled glucose including but not limited to macrosomia, birth injury, increased need for , birth defects, miscarriage/fetal demise,  hypoglycemia, persistent diabetes after pregnancy.    - discussed goal fasting and 2hr pp sugars (<95 and <120 respectively)   - discussed importance of low carb low sugar diet to help control sugars. - continue walking regimen.   - Growth scan completed this morning at 9am, no results yet  - continue NPH 10U AM, 16U at night. 3. Late to care: UDS neg.  SLIUP with  EGA of 29w4.  As her LMP based dates would place her at 26w2, we recommend using her 29wk US-based EDC  4. GBS Bacteruria: >100K on UCx from [employer] labs. -needs Abx at delivery   5. Rubella nonimmune- needs pp vaccination    6. Extra Digit: noted on MFM scan from 1/15.  Pt states her  and daughter also have an extra digit.  NIPT low risk  7. AMA: On ASA. 8. Obesity: BMI ~40.  Anatomy scan at 29w with EFW 49th%  9. BV:RESOLVED- noted on pap smear. S/p tx. Orders Placed This Encounter    AMB POC URINALYSIS DIP STICK AUTO W/O MICRO     Labor precautions discussed, including: Regular painful contractions, lasting for greater than one hour, taking your breath away; any vaginal bleeding; any leakage of fluid; or absent or decreased fetal movement. Call M.D. on call if any of these symptoms or signs occur. I have discussed the diagnosis with the patient and the intended plan as seen in the above orders. The patient has received an after-visit summary and questions were answered concerning future plans. I have discussed medication side effects and warnings with the patient as well. Informed pt to return to the office or go to the ER if she experiences vaginal bleeding, vaginal discharge, leaking of fluid, pelvic cramping.     Patient discussed with Dr. Paul Galan (attending physician)    Opal Kinsey DO  Family Medicine Resident

## 2021-03-01 ENCOUNTER — HOSPITAL ENCOUNTER (OUTPATIENT)
Dept: PERINATAL CARE | Age: 36
Discharge: HOME | End: 2021-03-01
Attending: OBSTETRICS & GYNECOLOGY
Payer: SUBSIDIZED

## 2021-03-01 ENCOUNTER — ROUTINE PRENATAL (OUTPATIENT)
Dept: FAMILY MEDICINE CLINIC | Age: 36
End: 2021-03-01
Payer: SUBSIDIZED

## 2021-03-01 VITALS
DIASTOLIC BLOOD PRESSURE: 73 MMHG | SYSTOLIC BLOOD PRESSURE: 110 MMHG | TEMPERATURE: 97.6 F | BODY MASS INDEX: 41.62 KG/M2 | HEART RATE: 92 BPM | OXYGEN SATURATION: 98 % | WEIGHT: 226.2 LBS | HEIGHT: 62 IN | RESPIRATION RATE: 18 BRPM

## 2021-03-01 DIAGNOSIS — O09.523 MULTIGRAVIDA OF ADVANCED MATERNAL AGE IN THIRD TRIMESTER: ICD-10-CM

## 2021-03-01 DIAGNOSIS — Z34.93 PREGNANT AND NOT YET DELIVERED IN THIRD TRIMESTER: Primary | ICD-10-CM

## 2021-03-01 DIAGNOSIS — O24.419 GDM, CLASS A2: ICD-10-CM

## 2021-03-01 DIAGNOSIS — R82.71 GBS BACTERIURIA: ICD-10-CM

## 2021-03-01 LAB
BILIRUB UR QL STRIP: NEGATIVE
GLUCOSE UR-MCNC: NEGATIVE MG/DL
KETONES P FAST UR STRIP-MCNC: NEGATIVE MG/DL
PH UR STRIP: 6.5 [PH] (ref 4.6–8)
PROT UR QL STRIP: NEGATIVE
SP GR UR STRIP: 1.02 (ref 1–1.03)
UA UROBILINOGEN AMB POC: NORMAL (ref 0.2–1)
URINALYSIS CLARITY POC: NORMAL
URINALYSIS COLOR POC: YELLOW
URINE BLOOD POC: NORMAL
URINE LEUKOCYTES POC: NORMAL
URINE NITRITES POC: NEGATIVE

## 2021-03-01 PROCEDURE — 0502F SUBSEQUENT PRENATAL CARE: CPT | Performed by: STUDENT IN AN ORGANIZED HEALTH CARE EDUCATION/TRAINING PROGRAM

## 2021-03-01 PROCEDURE — 76816 OB US FOLLOW-UP PER FETUS: CPT | Performed by: OBSTETRICS & GYNECOLOGY

## 2021-03-01 PROCEDURE — 81003 URINALYSIS AUTO W/O SCOPE: CPT | Performed by: STUDENT IN AN ORGANIZED HEALTH CARE EDUCATION/TRAINING PROGRAM

## 2021-03-01 PROCEDURE — 76819 FETAL BIOPHYS PROFIL W/O NST: CPT | Performed by: OBSTETRICS & GYNECOLOGY

## 2021-03-01 NOTE — PATIENT INSTRUCTIONS
Semanas 34 a 36 de lara embarazo: Instrucciones de cuidado  Weeks 34 to 36 of Your Pregnancy: Care Instructions  Instrucciones de cuidado    A estas [place], lara bebé y lara abdomen habrán crecido considerablemente. Brynn es [place] de yared a lizett. Los pulmones de lara bebé están brynn listos para respirar aire. Los huesos de la jasmin de lara bebé ahora son bastante firmes bladimir para protegerla zeb se mantienen lo suficientemente blandos bladimir para atravesar el canal de [place]. Es posible que sienta entusiasmo, nancy, ansiedad o miedo. Quizá se pregunte cómo se dará cuenta de si está en trabajo de parto o qué esperar en nito momento. Trate de ser flexible con madiha expectativas respecto del nacimiento. Dado que cada nacimiento es diferente, no hay manera de saber exactamente cómo será lara parto. Esta hoja de cuidados la ayudará a saber qué esperar y cómo prepararse. Le podría facilitar el parto. Si todavía no le brady aplicado la vacuna Tdap (tétanos, difteria y tos [place]) stephie moshe [place], hable con lara médico acerca de aplicársela. Bethena Maffucci a proteger a lara recién nacido contra la infección por tos ferina. En la semana 36, a la mayoría de las mujeres se les hace debby prueba de estreptococos del yovanny B (GBS, por madiha siglas en inglés). Los estreptococos del yovanny B son bacterias comunes que pueden vivir en la vagina y el recto. Pueden hacer que lara bebé se enferme después del parto. Si el resultado es positivo, usted recibirá antibióticos stephie el trabajo de [place]. Los medicamentos evitarán que lara bebé contraiga las bacterias. La atención de seguimiento es debby parte clave de lara tratamiento y seguridad. Asegúrese de hacer y acudir a todas las citas, y llame a lara médico si está teniendo problemas. También es debby buena idea saber los resultados de madiha exámenes y mantener debby lista de los medicamentos que ady. ¿Cómo puede cuidarse en el hogar?   Aprenda sobre las alternativas para aliviar el dolor  · El dolor se [place] de modo diferente en cada gatito. Hable con lara médico acerca de madiha sentimientos sobre el dolor. · Puede elegir entre varias formas de aliviar el dolor. Estas incluyen medicamentos o técnicas de respiración, así bladimir medidas para estar cómoda. Usted puede utilizar más de [place] opción. · Si elige un analgésico (medicamento para el dolor) stephie el trabajo de [place], hable con lara médico acerca de madiha opciones. Algunos medicamentos reducen la ansiedad y [place] [facility] a aliviar parte del dolor. Otros adormecen la parte inferior del cuerpo para que no sienta dolor. · Asegúrese de decirle a lara médico acerca de lara elección de analgésico antes de empezar el trabajo de parto o muy temprano en el [place] de [place]. Es posible que pueda cambiar de parecer a medida que avanza el [place]. · Mary vez se duerme a debby gatito con medicamentos administrados a través de debby máscara o por vía intravenosa (IV). Trabajo de parto y [place]  · La primera etapa del [place] de parto se divide en jarrell fases: Rosa Alvina y de transición. ? La mayoría de las mujeres experimentan la fase latente del [place] de parto en madiha hogares. Usted puede [employer] o descansar, comer refrigerios livianos, beber líquidos titus y comenzar a contar las contracciones. ? Cuando advierta que se le vuelve difícil hablar stephie debby contracción, es posible que esté por pasar a la fase activa. Stephie la fase Luigi Levee, debería ir al hospital si no está allí aún. ? Usted está en la fase activa cuando tiene contracciones cada 3 o 4 minutos y saleem alrededor de 60 segundos. El seb uterino comienza a abrirse con más rapidez.  ? Si se le rompe la karla, las contracciones serán más intensas y más frecuentes. ? Stephie la fase de transición, el seb uterino se estira y las contracciones se producen con Johanna Brick. ? Laly Ines tenga deseos de pujar, sin embargo es posible que el seb uterino aún no esté preparado.  El [employer] dirá cuándo pujar.  · La segunda etapa comienza cuando el seb uterino se abre por completo y usted está lista para pujar. ? Las contracciones son muy intensas a fin de empujar al bebé por el canal de parto. ? Sentirá la necesidad de pujar. Podría sentir bladimir si tuviera ganas de evacuar el intestino. ? Adline Lob entrenen a [employer]. Estas contracciones serán muy intensas zeb no ocurrirán con tanta frecuencia. Puede descansar un poco entre contracciones. ? Es posible que esté sensible e irritable. Es posible que no se dé cuenta de lo que pasa a lara alrededor. ? Un último esfuerzo y habrá nacido lara bebé. · La tercera etapa ocurre cuando con unas cuantas contracciones más se expulsa la placenta. [place] puede durar 30 minutos o menos. · La cuarta etapa es la de recuperación. Es posible que se sienta abrumada con las emociones y exhausta zeb alerta. Christel es un buen momento para comenzar el amamantamiento. ¿Dónde puede encontrar más información en inglés? Vaya a [email or web address].com/  Sheldon Bell [id number] en la búsqueda para aprender más acerca de \"Semanas 34 a 39 de lara embarazo: Instrucciones de cuidado. \"  Revisado: 11 febrero, 2020               Versión del contenido: 12.6  © 1450-6360 Healthwise, Incorporated. Las instrucciones de cuidado fueron adaptadas bajo licencia por [employer] (which disclaims liability or warranty for this information). Si usted tiene Yacolt Robson afección médica o sobre estas instrucciones, siempre pregunte a lara profesional de kiran. Healthwise, Incorporated niega toda garantía o responsabilidad por lara uso de esta información.

## 2021-03-01 NOTE — PROGRESS NOTES
I reviewed with the resident the medical history and the resident's findings on the physical examination. I discussed with the resident the patient's diagnosis and concur with the plan. 36yo [id number] @ 36w0d by 29wk scan   1. IUP: RH pos, do not repeat GBS testing   2. Late to care  3. Obesity: counseled on appropriate weight gain  4. AMA: ASA   5. Extra digit seen on baby: NIPT wnl  6. Rubella NI: offer vaccine PP   7. GBS bacteriuria: abx at delivery   8. GDM: log better on NPH 10/16. Discussed risks of uncontrolled glucose including but not limited to macrosomia, birth injury, increased need for , birth defects, miscarriage/fetal demise,  hypoglycemia, persistent diabetes after pregnancy. Growth scan on 3/1, completed but report pending.       Referred to [facility]

## 2021-03-01 NOTE — PROGRESS NOTES
Chief Complaint   Patient presents with    Routine Prenatal Visit     36 weeks      1. Have you been to the ER, urgent care clinic since your last visit? Hospitalized since your last visit? No    2. Have you seen or consulted any other health care providers outside of the [address] [place] [address] since your last visit? Include any pap smears or colon screening. No     Reviewed record in preparation for visit and have obtained necessary documentation. Patient denies have any bleeding,cramping ,spotting or leaking of fluid.

## 2021-03-06 NOTE — PROGRESS NOTES
Return OB Visit     Subjective:   Prosper Gunter is a 43 y.o.  at 37w0d by 23 wk ultrasound  Estimated Date of Delivery: 3/29/21  Interpretor used for this encounter. Pregnancy complicated by late to care, AMA, GBS+, GDM, rubella non immune    LOF: None  Vaginal bleeding: None  Fetal movement: Yes   Contractions: No  Dysuria: Yes  Headaches, blurred vision, RUQ pain: None  Taking prenatal vitamins: Yes   Taking ASA: Yes    Concerns today: None     Blood Sugar Log        Allergies   No Known Allergies    Medications:   Current Outpatient Medications   Medication Sig    aspirin delayed-release 81 mg tablet Take 1 Tab by mouth daily for 90 days.  insulin NPH (NOVOLIN N, HUMULIN N) 100 unit/mL injection Please administer 30 units of NPH in the morning and 10 units in the evening. (Patient taking differently: Please administer 10 units of NPH in the morning and 16 units in the evening.)    Insulin Needles, Disposable, 31 gauge x 5/16\" ndle Please administer 30 units of NPH in the morning and 10 units in the evening.  insulin syringe,safetyneedle (BD SafetyGlide Insulin Syringe) 0.5 mL 31 gauge x 15/64\" syrg 30 Units Every morning AND 10 Units every evening.  PNV with Ca No.65-Iron Poly-FA 60 mg iron-1 mg cap Take  by mouth. No current facility-administered medications for this visit. Past Medical History:  Past Medical History:   Diagnosis Date    Abnormal Papanicolaou smear of cervix     History of abnormal cervical Pap smear      Past Surgical History:   No past surgical history on file.      Social History:  Social History     Tobacco Use    Smoking status: Never Smoker    Smokeless tobacco: Never Used   Substance Use Topics    Alcohol use: Never     Frequency: Never     Binge frequency: Never    Drug use: Never     Immunizations:   Immunization History   Administered Date(s) Administered    Influenza Vaccine (Quad) PF (>6 Mo Flulaval, Fluarix, and >3 Yrs Afluria, Fluzone [id number]) 2021    Tdap 2021     Objective     Visit Vitals  /73 (BP 1 Location: Left upper arm, BP Patient Position: Sitting)   Pulse 82   Temp 97.8 °F (36.6 °C) (Temporal)   Resp 20   Ht 5' 1.5\" (1.562 m)   Wt 230 lb 3.2 oz (104.4 kg)   LMP 07/15/2020 (Approximate)   SpO2 98%   BMI 42.79 kg/m²     Physical Exam  GENERAL APPEARANCE: alert, well appearing, in no apparent distress  ABDOMEN: gravid, fundal height 45 cm, FHT present at 155 bpm  PSYCH: normal mood and affect    Labs  No results found for this or any previous visit (from the past 12 hour(s)). Assessment   Keaton Ordaz is a 43 y.o.  at 37w0d by 23wk ultrasound here for a return OB visit. Estimated Date of Delivery: 3/29/21   Plan   IUP   · IOB labs: AB+, Ab screen neg, HIV NR, Hep B neg, CBC w/Hb 13.5, VZV immune, RPR , Rubella NON-IMMUNE, Hbg fract nml, urine cx GBS+, GC/CT NEG, pap NILM. UDS negative   · Genetic Screening: NIPT low risk, negative 274 diseases. Male fetus. · Anatomy scan with M: 29wks (01/15) - anterior placenta, EFW 49th%,  extra small post-axial (\"pinky side\") digit at least on the left hand-(FOB and another child have extra digits per mom)   · Failed 1hr GTT, >200. · S/p Tdap and flu   · Third trimester CBC today   · Repeat Growth scan (): cephalic presentation, mild polyhydramnios, BPP 8/8, continue weekly testing   · Ultrasound to confirm position:  Seen by MFM today and will be seen weekly, noted to be Cephalic. No in-clinic scan performed. · Scheduled for IOL at 39 wk - baltazar on 3/22, PLEASE INFORM PATIENT AT NEXT VISIT. Other  GDM: insulin controlled, reviewed with Dr. Cruz Points. No changes made at today's visit .   - Continue NPH 10U AM, 16U at night.   - Growth scan (): 50%ile, continue weekly testing    GBS Bacteruria: on initial urine culture, treated with Amoxicillin    - Will need Abx ppx     Bacterial Vaginosis: noted on pap smear, treated with metrogel     AMA: Continue daily ASA     Late to care: at 23 weeks. UDS negative     ---------------  Continuity Provider: Floridalma Mendozaseen with [facility]   -------------------  Labor precautions discussed, including: Regular painful contractions, lasting for greater than one hour, taking your breath away; any vaginal bleeding; any leakage of fluid; or absent or decreased fetal movement. Call M.D. on call if any of these symptoms or signs occur. I have discussed the diagnosis with the patient and the intended plan as seen in the above orders. The patient has received an after-visit summary and questions were answered concerning future plans. I have discussed medication side effects and warnings with the patient as well. Informed pt to return to the office or go to the ER if she experiences vaginal bleeding, vaginal discharge, leaking of fluid, pelvic cramping.     Patient discussed with Dr. Иван Pena (Attending Physician)    Byron Davey MD  Family Medicine Resident

## 2021-03-08 ENCOUNTER — ROUTINE PRENATAL (OUTPATIENT)
Dept: FAMILY MEDICINE CLINIC | Age: 36
End: 2021-03-08

## 2021-03-08 ENCOUNTER — HOSPITAL ENCOUNTER (OUTPATIENT)
Dept: PERINATAL CARE | Age: 36
Discharge: HOME | End: 2021-03-08
Attending: OBSTETRICS & GYNECOLOGY
Payer: SUBSIDIZED

## 2021-03-08 VITALS
WEIGHT: 230.2 LBS | TEMPERATURE: 97.8 F | HEIGHT: 62 IN | OXYGEN SATURATION: 98 % | HEART RATE: 82 BPM | RESPIRATION RATE: 20 BRPM | SYSTOLIC BLOOD PRESSURE: 114 MMHG | DIASTOLIC BLOOD PRESSURE: 73 MMHG | BODY MASS INDEX: 42.36 KG/M2

## 2021-03-08 DIAGNOSIS — Z3A.37 37 WEEKS GESTATION OF PREGNANCY: Primary | ICD-10-CM

## 2021-03-08 PROCEDURE — 0502F SUBSEQUENT PRENATAL CARE: CPT | Performed by: STUDENT IN AN ORGANIZED HEALTH CARE EDUCATION/TRAINING PROGRAM

## 2021-03-08 PROCEDURE — 76819 FETAL BIOPHYS PROFIL W/O NST: CPT | Performed by: OBSTETRICS & GYNECOLOGY

## 2021-03-08 NOTE — PROGRESS NOTES
I reviewed with the resident the medical history and the resident's findings on the physical examination. I discussed with the resident the patient's diagnosis and concur with the plan. 34yo [id number] @ 37w0d by 29wk scan   1. IUP: RH pos, s/p flu and tdap   2. GDM with poly: log better on NPH 10/16. Discussed risks of uncontrolled glucose including but not limited to macrosomia, birth injury, increased need for , birth defects, miscarriage/fetal demise,  hypoglycemia, persistent diabetes after pregnancy. EFW 50th%, weekly visits with MFM. Scheduled for IOL at 39 wk - baltazar on 3/22, pt not yet aware and can inform at next visit. 3.  Obesity: counseled on appropriate weight gain  4. AMA: ASA   5. Extra digit seen on baby: NIPT wnl  6. Rubella NI: offer vaccine PP   7. GBS bacteriuria: abx at delivery   8.   Late to care    Referred to [facility]

## 2021-03-08 NOTE — PROGRESS NOTES
Chief Complaint   Patient presents with    Routine Prenatal Visit     37 weeks,  Praveena     1. Have you been to the ER, urgent care clinic since your last visit? Hospitalized since your last visit? No    2. Have you seen or consulted any other health care providers outside of the [address] [place] [address] since your last visit? Include any pap smears or colon screening. No     Reviewed record in preparation for visit and have obtained necessary documentation.

## 2021-03-08 NOTE — PATIENT INSTRUCTIONS
Semana 38 de lara embarazo: Instrucciones de cuidado  Week 38 of Your Pregnancy: Care Instructions  Instrucciones de cuidado    Aunque no lo crea, lara bebé está por nacer. Es posible que ya tenga ideas acerca de la personalidad de lara bebé debido a cuánto se mueve. O oswald vez haya notado cómo responde a los sonidos, al calor, al frío y a la lizett. Incluso podría saber qué tipo de [place] a lara bebé. A estas [place], usted tiene [address] idea de qué puede esperar stephie el [place]. Es posible que haya hablado de madiha preferencias del nacimiento con lara médico. Amna incluso si usted quiere un nacimiento por vía vaginal, es debby buena idea aprender [employer] nacimientos por cesárea. Los partos por cesárea son [employer] bebés nacen por medio de un josh (incisión) hecho en la parte inferior del abdomen. A veces, es la mejor opción para la kiran del bebé y de [place]. Esta hoja de cuidados puede ayudarla a entender los nacimientos por cesárea. También le da información sobre qué esperar después del nacimiento de lara bebé. Y la ayuda a comprender [employer] depresión posparto. La atención de seguimiento es debby parte clave de lara tratamiento y seguridad. Asegúrese de hacer y acudir a todas las citas, y llame a lara médico si está teniendo problemas. También es debby buena idea saber los resultados de madiha exámenes y mantener debby lista de los medicamentos que ady. ¿Cómo puede cuidarse en el hogar? Aprenda sobre el parto por cesárea  · La mayoría de los partos por cesárea no están planeados. Se hacen por problemas que se producen stephie el trabajo de [place]. Estos problemas podrían incluir:  ? El [place] de parto se vuelve más lento o se detiene. ? Presión arterial tigre u otros problemas para la madre.  ? Señales de sufrimiento del bebé. Estas señales pueden incluir debby frecuencia cardíaca muy rápida o lenta.   · [place] y los bebés están fidelina después de un parto por Mesha Lowe cesárea es debby Jolena Shelby. Tiene más riesgos que un parto vaginal.  · En algunos casos, un parto por cesárea planificado puede ser más seguro que un parto vaginal. [place] puede ser el kate si:  ? La madre tiene un problema de kiran, bladimir debby afección cardíaca. ? El bebé no está en posición con la jasmin para abajo para el parto. Esta posición se llama de nalgas. ? El útero tiene cicatrices de cirugías anteriores. [place] podría aumentar la probabilidad de un desgarro en el Den[place]ito [place]. ? Hay un problema con la placenta. ? La madre tiene debby infección, bladimir herpes genital, que podría transmitirse al bebé. ? La madre está embarazada con mellizos o más bebés. ? El bebé pesa de 9 a 8 libras o más. · Debido a los riesgos del parto por cesárea, las cesáreas planeadas deberían hacerse generalmente solo por motivos médicos. Y debby cesárea planeada debería hacerse en la semana 44 o más tarde jet que haya un motivo médico para hacerla antes. Sepa lo que ocurrirá después del parto y cómo planificar las primeras semanas en lara hogar  · Usted, lara bebé y lara roxanna o instructor [place] bandas de identificación. Solo las personas que tengan bandas que coincidan podrán retirar al bebé de la danya de recién nacidos. · Aprenderá a alimentar a lara bebé, cambiar el pañal y bañar al bebé. Fiorella Emrcedes a cuidar del muñón del cordón umbilical. Si Sheryl Rossi a circuncidar a lara bebé, también aprenderá a cuidar debby circuncisión. · Pídale a las visitas que esperen a visitarla cuando esté en lara hogar. Y pídales que se laven las kris antes de tocar al bebé. · Asegúrese de tener otro adulto en casa por lo menos 2 o 3 días después del [place]. · Benjamin las primeras 2 semanas, limite las visitas de familiares y amigos. · No permita visitantes que tengan resfriados o infecciones. Asegúrese de que todos los visitantes estén al día con madiha vacunas. Nunca deje que nadie fume cerca de lara bebé. · Trate de dormir debby siesta cuando lara bebé duerma.   Sea consciente de la depresión posparto  · La \"melancolía de la maternidad\" es común en las primeras 1 o 2 semanas después del [place]. Es posible que tenga ganas de llorar o se sienta la nena o irritable sin motivo alguno. · En algunas mujeres, estas emociones saleem más tiempo y son más intensas. A esto se lo conoce bladimir depresión posparto. · Si madiha síntomas saleem más de unas pocas semanas, o si se siente muy deprimida, pídale ayuda a lara médico.  · La depresión posparto sí puede tratarse. Los grupos de apoyo y la asesoría psicológica pueden ser de [place]. A veces, los medicamentos también pueden ayudar. ¿Dónde puede encontrar más información en inglés? Vaya a [email or web address]  Leonila B044 en la búsqueda para aprender más acerca de \"Semana 45 de lara embarazo: Instrucciones de cuidado. \"  Revisado: 11 febrero, 2020               Versión del contenido: 12.6  © 7481-4600 Healthwise, Incorporated. Las instrucciones de cuidado fueron adaptadas bajo licencia por Good Help Connections (which disclaims liability or warranty for this information). Si usted tiene [place] afección médica o sobre estas instrucciones, siempre pregunte a lara profesional de kiran. [employer] niega toda garantía o responsabilidad por lara uso de esta información.

## 2021-03-09 LAB
ERYTHROCYTE [DISTWIDTH] IN BLOOD BY AUTOMATED COUNT: 12.8 % (ref 11.5–14.5)
HCT VFR BLD AUTO: 42.1 % (ref 35–47)
HGB BLD-MCNC: 13.9 G/DL (ref 11.5–16)
MCH RBC QN AUTO: 31.7 PG (ref 26–34)
MCHC RBC AUTO-ENTMCNC: 33 G/DL (ref 30–36.5)
MCV RBC AUTO: 95.9 FL (ref 80–99)
NRBC # BLD: 0 K/UL (ref 0–0.01)
NRBC BLD-RTO: 0 PER 100 WBC
PLATELET # BLD AUTO: 212 K/UL (ref 150–400)
PMV BLD AUTO: 12.4 FL (ref 8.9–12.9)
RBC # BLD AUTO: 4.39 M/UL (ref 3.8–5.2)
WBC # BLD AUTO: 10.5 K/UL (ref 3.6–11)

## 2021-03-11 ENCOUNTER — HOSPITAL ENCOUNTER (EMERGENCY)
Age: 36
Discharge: HOME | End: 2021-03-11
Attending: FAMILY MEDICINE | Admitting: FAMILY MEDICINE
Payer: SUBSIDIZED

## 2021-03-11 VITALS
RESPIRATION RATE: 16 BRPM | SYSTOLIC BLOOD PRESSURE: 118 MMHG | HEIGHT: 60 IN | DIASTOLIC BLOOD PRESSURE: 71 MMHG | HEART RATE: 88 BPM | TEMPERATURE: 98.5 F | WEIGHT: 230 LBS | BODY MASS INDEX: 45.16 KG/M2

## 2021-03-11 PROCEDURE — 75810000275 HC EMERGENCY DEPT VISIT NO LEVEL OF CARE

## 2021-03-11 PROCEDURE — 2709999900 HC NON-CHARGEABLE SUPPLY

## 2021-03-11 PROCEDURE — 99284 EMERGENCY DEPT VISIT MOD MDM: CPT

## 2021-03-11 PROCEDURE — 99283 EMERGENCY DEPT VISIT LOW MDM: CPT

## 2021-03-11 PROCEDURE — 59025 FETAL NON-STRESS TEST: CPT

## 2021-03-11 RX ORDER — ACETAMINOPHEN 325 MG/1
650 TABLET ORAL ONCE
Status: DISCONTINUED | OUTPATIENT
Start: 2021-03-11 | End: 2021-03-11

## 2021-03-11 NOTE — PROGRESS NOTES
1041 Patient arrived as a  at 37w 3d from the ED. Patient chief complaint is CTX every 7 minutes. Patient states she did not call her Doctor before coming in.     [id number] EFM applied by this RN. [id number] RN using interpretation services to communicate with the patient. Patient denies any LOF, VB. Patient states her cervix was dilated to 2cm on Monday when seen in the office. Patient denies significant medical history except for GMD patient states she takes 10 units in the mornings and 16 units at bedtime. 1113 Family practice residents bedside at this time. 1120 SVE performed by Dr. Rosaline Cid fingertip/20/-3.    [phone] performed at this time. Vertex presentation confirmed. [address] Residents adjusting EFM US after [place].     1135 Residents update this RN that after speaking with Dr. Paco Vines that patient can ambulate if she wants and we will re-check cervix in one hour. [id number] Patient ambulatory to the bathroom, able to void without difficulty. 1239 Repeat SVE fingertip. 1250 RN on the phone with Dr. Paco Vines. Md gives Jonel Muñoz to discharge patient home and to have patient keep all regular appointments. 8624 Moreno Sawyer bedside going over discharge instructions including to keep all regularly scheduled appointments, kick counts, week 37 precautions, and early labor at home. Patient verbalizes understanding and denies any questions. 1320 Patient ambulatory off the unit at this time with all personal belongings.

## 2021-03-11 NOTE — DISCHARGE INSTRUCTIONS
Fase inicial del trabajo de parto en el hogar: Instrucciones de cuidado  Early Stage of Labor at Home: Care Instructions  Instrucciones de cuidado    Si usted fue al hospital [employer] estaba en la fase inicial del [facility], oswald vez lara médico le haya preguntado si [place] en casa hasta que las contracciones tien más daniel. Muchas mujeres se quedan en casa en la fase inicial del trabajo de [place]. Esta suele ser la parte más larga del [place] de yared a lizett. Puede durar hasta 2 o 3 aarti. Las contracciones son de leves a moderadas y más cortas (alrededor de 30 a 39 segundos). Generalmente, usted puede seguir [place] tiene. Las contracciones también pueden ser irregulares, aproximadamente a intervalos de 5 a 20 minutos. Incluso pueden detenerse por un rato. Es útil permanecer lo más relajada que pueda stephie [place]. Usted puede pasar debby parte de la fase inicial del [facility], o toda, en lara hogar o en cualquier otro lugar donde pueda estar cómoda. Si vive lejos del hospital o del centro de maternidad, oswald vez desee considerar ir a algún lugar cercano de modo que pueda volver rápidamente al hospital.  Weyman Jessy mujeres, puede michelle beneficios en quedarse en casa stephie la fase [facility]icial del [facility], bladimir evitar medicamentos o procedimientos. A medida que avanza el trabajo de [place], usted pasará de la fase inicial al [facility]. [address] contracciones se vuelven más intensas. Ocurren más a menudo, aproximadamente cada 2 o 3 minutos. También [employer], alrededor de 50 a 70 segundos. Las sentirá aun cuando cambia de posición y camina o se desplaza. Puede ser difícil de decir si está en trabajo de parto [place]. Si no está samaniego, llame a lara médico o partera. A medida que avanza el trabajo de [place], consulte con lara médico o partera acerca de cuándo debería volver al hospital o [facility] de [place]  Es posible que le den instrucciones especiales si rompió la bolsa de yola o si se ha encontrado que tiene debby infección por estreptococos del [place]. La atención de seguimiento es debby parte clave de lara tratamiento y seguridad. Asegúrese de hacer y acudir a todas las citas, y llame a lara médico si está teniendo problemas. También es debby buena idea saber los resultados de madiha exámenes y mantener debby lista de los medicamentos que ady. ¿Cómo puede cuidarse en el hogar? · Consiga apoyo. Tener debby persona de apoyo a lara lado desde el principio del trabajo de parto hasta después del nacimiento puede tener un efecto positivo en el [place]. · Encuentre cosas que la distraigan. Stephie la fase inicial del [facility], usted puede caminar, jugar a las cartas, mirar televisión o escuchar música bladimir ayuda para distraerse de las contracciones. · Pídale a lara roxanna, asistente de trabajo de parto o  que le marlene un masaje. Los [employer] hombros y en la parte baja de la espalda stephie las contracciones podrían aliviarle el dolor. Masajes daniel de los músculos de la espalda (contrapresión) stephie las contracciones pueden ayudar a aliviar el dolor de espalda en el [facility]. Dígale a lara asistente del trabajo de parto exactamente dónde y con cuánta fuerza debe presionar. · Use imágenes. [place] consiste en usar lara imaginación para reducir lara dolor. Por ejemplo, para ayudar a manejar el dolor, visualice las contracciones bladimir olas a lara alrededor. Imagine un lugar tranquilo, bladimir debby playa o un arroyo en [place], para que la ayude a relajarse entre las contracciones. · Cambie de posición stephie el trabajo de [place]. Caminar, arrodillarse o sentarse en debby pelota brodie de goma (pelota de parto) son Neetu Dance opciones. · Use técnicas de respiración concentrada. Respirar siguiendo un ritmo puede distraerle del dolor. · Dese debby ducha o baño tibios. El agua tibia puede aliviarle el dolor y el estrés. ¿Cuándo debe pedir ayuda?    Llame al 911 en cualquier momento que considere que necesita atención de [place]. Por ejemplo, llame si:    · Se desmayó (perdió el conocimiento).     · Tiene convulsiones.     · Tiene sangrado vaginal intenso.     · Siente dolor intenso en el abdomen o en la pelvis.     · Le sale abundante líquido o gotea de la vagina y sabe o nila que el cordón umbilical está empezando a salir por la vagina. Si esto sucede, arrodíllese de inmediato de oswald forma que tenga las nalgas (glúteos) más altas que la jasmin. [place] disminuirá la presión sobre el cordón umbilical hasta que llegue ayuda. Llame a lara médico ahora mismo o busque atención médica inmediata si:    · Tiene señales de preeclampsia nuevas o peores, bladimir:  ? Hinchazón repentina de la dickson, las kris o los pies. ? Nuevos problemas de visión (bladimir oscurecimiento, bartolo borroso o bartolo puntos). ? Dolor de jasmin intenso.     · Tiene cualquier tipo de sangrado vaginal.     · Tiene dolor o cólicos abdominales.     · Tiene fiebre.     · Major Adolfo tenido contracciones regulares (con o sin dolor) stephie debby hora. [place] significa que tiene 8 o más contracciones en 1 hora o que tiene 4 contracciones o más en 20 minutos después de [place] de posición y tree líquidos.     · Tiene debby pérdida repentina de líquido por la vagina.     · Tiene dolor en la parte baja de la espalda o presión en la pelvis que no desaparece.     · Nota que lara bebé ha dejado de moverse o lo hace mucho menos de lo habitual.   Preste especial atención a los cambios en lara kiran y asegúrese de comunicarse con lara médico si tiene algún problema. ¿Dónde puede encontrar más información en inglés? Shaina Welch a [email or web address]  [place] Rist [id number] en la búsqueda para aprender más acerca de \"Fase inicial del trabajo de parto en el hogar: Instrucciones de cuidado. \"  Revisado: 11 febrero, 2020               Versión del contenido: 12.6  © 1576-1722 [facility], Incorporated.    Las instrucciones de cuidado fueron adaptadas bajo licencia por Good [employer] (which disclaims liability or warranty for this information). Si usted tiene [place] afección médica o sobre estas instrucciones, siempre pregunte a lara profesional de kiran. [facility], Incorporated niega toda garantía o responsabilidad por lara uso de esta información. Semana 40 de lara embarazo: Instrucciones de cuidado  Week 37 of Your Pregnancy: Care Instructions  Instrucciones de cuidado    Usted está cerca del final de lara embarazo, y probablemente esté bastante incómoda. Puede ser más difícil caminar. Acostarse probablemente tampoco sea cómodo. Podría tener dificultad para dormir o para permanecer dormida. La mayoría de las mujeres black a lizett entre las 40 y 43 semanas. Christel es un buen momento para pensar en preparar un maletín para el hospital con los artículos que necesitará. Entonces estará lista para cuando comience el [facility] trace [place]. La atención de seguimiento es debby parte clave de lara tratamiento y seguridad. Asegúrese de hacer y acudir a todas las citas, y llame a lara médico si está teniendo problemas. También es debby buena idea saber los resultados de madiha exámenes y mantener debby lista de los medicamentos que ady. ¿Cómo puede cuidarse en el hogar? Aprenda sobre el amamantamiento  · El amamantamiento es lo mejor para lara bebé y Elkin Helm es knutson para usted. · La leche materna tiene anticuerpos que ayudan al bebé a combatir las infecciones. · Las madres que amamantan suelen bajar de peso más rápidamente, debido a que elaborar leche quema calorías. · Informarse acerca de las mejores maneras de sostener a lara bebé le facilitará el amamantamiento. · Deje que lara roxanna bañe y [employer] pañales del bebé para que no se sienta excluida. Acurrúquense juntos cuando amamante a lara bebé. · Es posible que desee aprender a usar un sacaleches y guardar lara [place].   · Si elige alimentar a lara bebé con biberón, hágalo de la [employer] resulte más parecida al amamantamiento para que pueda establecer un vínculo con lara bebé. Siempre sostenga al bebé y el biberón. No apoye el biberón ni deje que lara bebé se quede dormido con él. Aprenda sobre el llanto  · Es normal que los bebés lloren de 1 a 3 horas al día. Algunos lloran más, otros menos. · Los bebés no lloran para causarle molestias ni porque usted sea [address]. · Llorar es la forma de comunicarse que tiene el bebé. Lara bebé puede [place] [employer] o gases, necesitar un cambio de pañal, sentir frío o calor, sentirse solo o tenso. A veces, los bebés lloran por motivos desconocidos. · Si usted responde a las necesidades de lara bebé, moshe aprenderá a confiar en usted. · Intente mantener la calma cuando lara bebé llore. Lara bebé se puede sentir más molesto si siente que usted [place]. Sepa cómo cuidar a lara recién nacido  · El muñón del cordón umbilical de lara bebé se caerá solo, por lo general entre las semanas 1 y 2. Para cuidar la sindi del cordón umbilical de lara bebé:  ? Limpie la sindi de la parte inferior del cordón umbilical 2 o 3 veces al día. ? Ponga especial atención en la sindi en donde el cordón se fija a la piel. ? Mantenga el pañal doblado debajo del cordón. ? Utilice debby toallita húmeda o algodón para darle un baño de esponja a lara bebé hasta que se le haya caído el muñón. · La primera evacuación intestinal oscura de lara bebé se conoce bladimir meconio. Después del meconio, el bebé tendrá madiha propios hábitos de evacuación intestinal.  ? Algunos bebés, especialmente aquellos que se alimentan con [address], tienen varias evacuaciones al día. Otros tienen [employer] al día, o debby cada 2 o 3 días. ? Los bebés que son amamantados a menudo tienen evacuaciones sueltas amarillentas. Los bebés que se alimentan con leche de fórmula evacuan heces más sólidas. ? Si lara bebé tiene evacuaciones bladimir bolitas pequeñas, está estreñido. Después de 2 aarti de estreñimiento, llame al médico de lara bebé.   · Si lara bebé va a ser circuncidado, usted puede cuidarlo en el hogar. ? Enjuáguele delicadamente el pene con agua tibia cada vez que le cambie los pañales. No intente retirar la membrana que se forma sobre el pene. Esta membrana desaparecerá por sí diamond. Séquele la sindi con toques suaves de toalla. ? Coloque debby pomada a base de petróleo, bladimir vaselina, en la sindi del pañal que tendrá contacto con el pene de lara bebé. [place] evitará que el pañal se le pegue al bebé. ? Pregúntele al médico acerca de darle acetaminofén (Tylenol) a lara bebé para el dolor. ¿Dónde puede encontrar más información en inglés? Vaya a [email or web address]  Leonila [id number] en la búsqueda para aprender más acerca de \"Semana 40 de lara embarazo: Instrucciones de cuidado. \"  Revisado: 11 febrero, 2020               Versión del contenido: 12.6  © 3004-7919 Healthwise, Incorporated. Las instrucciones de cuidado fueron adaptadas bajo licencia por Good Help Connections (which disclaims liability or warranty for this information). Si usted tiene [place] afección médica o sobre estas instrucciones, siempre pregunte a lara profesional de kiran. Healthwise, Incorporated niega toda garantía o responsabilidad por lara uso de esta información. Conteo de las patadas de lara bebé: Instrucciones de cuidado  [employer] Your Baby's Kicks: Care Instructions  Instrucciones de cuidado    Contar las patadas de lara bebé es debby manera en la que lara médico puede establecer si lara bebé es saludable. La mayoría de las [facility], sobre todo en el primer embarazo, siente que lara bebé se mueve por primera vez entre las semanas 12 y 25. El movimiento puede sentirse más bladimir aleteos que bladimir patadas. Es posible que lara bebé se mueva más a ciertas horas del día. Cuando phyllis Stephens, puede notar menos patadas que cuando está descansando. En madiha visitas prenatales, lara médico le preguntará si el bebé está activo.   En el último trimestre, lara médico le puede pedir que cuente la cantidad de veces que sienta que el bebé se mueve. La atención de seguimiento es debby parte clave de lara tratamiento y seguridad. Asegúrese de hacer y acudir a todas las citas, y llame a lara médico si está teniendo problemas. También es debby buena idea saber los resultados de madiha exámenes y mantener debby lista de los medicamentos que ady. ¿Cómo se cuentan las patadas fetales? · Un método común para revisar el movimiento de lara bebé es contar la cantidad de patadas o movimientos que sienta en 1 hora. Es normal sentir 10 movimientos (bladimir patadas, aleteos o vueltas) en 1 hora. Algunos médicos sugieren que cuente stephie la [facility] llegar a los 10 movimientos. Luego usted puede dejar de contar por nito día y comenzar otra vez al día siguiente. · Para contar, elija el momento del día en que lara bebé esté más activo. Podría ser cualquier momento entre la mañana y la noche. · Si no siente 10 movimientos en debby hora, es posible que lara bebé esté durmiendo. Espere hasta la próxima hora y vuelva a contar. ¿Cuándo debe pedir ayuda? Llame a lara médico ahora mismo o busque atención médica inmediata si:    · Siente que lara bebé ha dejado de moverse o se mueve mucho menos de lo normal.   Preste especial atención a los cambios en lara kiran y asegúrese de comunicarse con lara médico si tiene cualquier problema. ¿Dónde puede encontrar más información en inglés? Olga Elias a [email or web address]  Leonila [id number] en la búsqueda para aprender más acerca de \"Conteo de las patadas de lara bebé: Instrucciones de cuidado. \"  Revisado: 11 febrero, 2020               Versión del contenido: 12.6  © 7420-5981 [facility], Incwise, Incorporated. Las instrucciones de cuidado fueron adaptadas bajo licencia por Good Help Connections (which disclaims liability or warranty for this information). Si usted tiene [place] afección médica o sobre estas instrucciones, siempre pregunte a lara profesional de kiran.  [facility], Incorporated niega toda garantía o responsabilidad por lara uso de esta información.

## 2021-03-11 NOTE — H&P
[address] [facility] [address] [place] [address]   Office [phone], Fax [phone]      History & Physical    Name: Zo Thompson MRN: [id number]  SSN: [id number]    YOB: 1985  Age: 43 y.o. Sex: female      Subjective:     Reason for Admission:  Pregnancy and Contractions    History of Present Illness: Ms. Ruddy Dacosta is a 43 y.o.   female with an estimated gestational age of 44w3d with Estimated Date of Delivery: 3/29/21. Patient complains of abdominal contractions that started today around 1:00 AM, radiates to pelvic and lower back area. Pt reports duration of ~1 minute and every ~7 minutes in frequency, she states that contractions have slightly increased in frequency. and duration. Pregnancy has been complicated by late to care, AMA, Obesity, GBS+, Rubella NI, GDM insulin-controlled, Polyhydramnios and fetal polydactyly. Patient denies chest pain, fever, headache , nausea and vomiting, right upper quadrant pain  , shortness of breath, vaginal bleeding , vaginal leaking of fluid , visual disturbances and burning with urination, urinary frequency, urinary urgency and vaginal itching. OB History    Para Term  AB Living   4 3 3     3   SAB TAB Ectopic Molar Multiple Live Births             3      # Outcome Date GA Lbr Luc/2nd Weight Sex Delivery Anes PTL Lv   4 Current            3 Term 06/07/15 37w0d  8 lb (3.629 kg) F Vag-Spont   ANSON   2 Term 12 37w0d  8 lb (3.629 kg) M Vag-Spont   ANSON   1 Term 03 38w0d  7 lb (3.175 kg) M Vag-Spont   ANSON      Obstetric Comments   First 3 babies all [facility] in [facility]      Past Medical History:   Diagnosis Date    Abnormal Papanicolaou smear of cervix     Diabetes ([facility] [address]     Gestational diabetes     History of abnormal cervical Pap smear      No past surgical history on file.   Social History     Occupational History    Not on file   Tobacco Use    Smoking status: Never Smoker    Smokeless tobacco: Never Used Substance and Sexual Activity    Alcohol use: Never     Frequency: Never     Binge frequency: Never    Drug use: Never    Sexual activity: Yes     Partners: Male      Family History   Problem Relation Age of Onset    No Known Problems Mother     Cancer Father     No Known Problems Sister     No Known Problems Brother     No Known Problems Maternal Grandmother     No Known Problems Maternal Grandfather     No Known Problems Paternal Grandmother     No Known Problems Paternal Grandfather        No Known Allergies  Prior to Admission medications    Medication Sig Start Date End Date Taking? Authorizing Provider   aspirin delayed-release 81 mg tablet Take 1 Tab by mouth daily for 90 days. 2/22/21 5/23/21 Yes Marylee Bees, DO   insulin NPH (NOVOLIN N, HUMULIN N) 100 unit/mL injection Please administer 30 units of NPH in the morning and 10 units in the evening. Patient taking differently: Please administer 10 units of NPH in the morning and 16 units in the evening. 2/1/21  Yes Joseph Molina MD   PNV with Ca No.65-Iron Poly-FA 60 mg iron-1 mg cap Take  by mouth. Yes Provider, Historical   Insulin Needles, Disposable, 31 gauge x 5/16\" ndle Please administer 30 units of NPH in the morning and 10 units in the evening. 2/1/21   Joseph Molina MD   insulin syringe,safetyneedle (BD SafetyGlide Insulin Syringe) 0.5 mL 31 gauge x 15/64\" syrg 30 Units Every morning AND 10 Units every evening. 2/1/21   Joseph Molina MD        Review of Systems:  A comprehensive review of systems was negative except for that written in the History of Present Illness. Objective:     Vitals:    Vitals:    03/11/21 1054 03/11/21 1057   BP: 118/71    Pulse: 88    Weight:  230 lb (104.3 kg)   Height:  5' (1.524 m)      No data recorded. BP  Min: 118/71  Max: 118/71     Physical Exam:  Patient without distress.   Heart: Regular rate and rhythm, S1S2 present or without murmur or extra heart sounds  Lung: clear to auscultation throughout lung fields, no wheezes, no rales, no rhonchi and normal respiratory effort  Abdomen: Gravid, soft, nontender  Fundus: soft and non tender  Lower Extremities: Trace bilateral edema  Membranes:  Intact   Cervical Examen: Fingertip/20%/-3. Performed by this MD.   Fetal Heart Rate:  Reactive  Baseline: 135 per minute  Variability: moderate  Accelerations: yes  Decelerations: none  Uterine contractions: irregular, every 4-7 minutes     Cephalic presentation confirmed by bedside US. Lab/Data Review:  No results found for this or any previous visit (from the past 24 hour(s)). Assessment and Plan:     Ms. Sandeep Aguilar is a 43 y.o.   female with an estimated gestational age of 44w3d who is here for contractions. Rule out labor: Irregular contractions, SVE fingertip/20%/-3 (Unchanged from 3/1/21). NST Cat 1.   - FHR monitoring  - PO Hydration  - Ambulate at will  - Will re-check SVE in ~ 1 hr    IUP   ? IOB labs: AB+, Ab screen neg, GBS + HIV NR, Hep B neg, CBC w/Hb 13.5, VZV immune, RPR , Rubella NON-IMMUNE, Hbg fract nml, urine cx GBS+, GC/CT NEG, pap NILM. UDS negative. S/p Tdap and flu    ? Genetic Screening: NIPT low risk, negative 274 diseases. Male fetus. ? Anatomy scan with MFM: 29wks (01/15) - anterior placenta, EFW 49th%, extra small post-axial (\"pinky side\") digit at least on the left hand (FHx of poydactyly). ? Growth scan on 49: Cephalic. BPP 8/8, Normal AF. EFW: 50th%. ? Growth scan (): cephalic presentation, mild polyhydramnios, BPP 8/8.      GDM: insulin controlled. On NPH 10U AM, 16U at night.   - Growth scan (): BPP 8/8. TIMOTHY c/w mild polyhydramnios     GBS Bacteruria: on initial urine culture, treated with Amoxicillin   - Will need Abx ppx      Bacterial Vaginosis: noted on pap smear, treated with metrogel      AMA: Continue daily ASA      Late to care: at 23 weeks.  UDS negative     Obesity: BMI:44.9         Addendum 12:52 PM  Pt re-evaluated by this MD. She continues having irregular contractions, she had very mild vaginal bleeding when she went to the bathroom for voiding. NST Cat1. SVE unchanged from above (fingertip/20%/-3). Mild blood present in gloves. No clots. Pt discussed with Dr. Kyleigh Feranndo who agreed with sending pt home with all instructions to when to come back to ER. Pt have an appt at [facility] on 3/15/21 and she is scheduled for IOL on 3/22/21. Pt verbalized understanding. I have discussed the diagnosis with the patient and the intended plan as seen in the above orders. All questions were answered concerning future plans. I have discussed medication side effects and warnings with the patient as well. Labor precautions discussed, including: Regular painful contractions, lasting for greater than one hour, taking your breath away; any vaginal bleeding; any leakage of fluid; or absent or decreased fetal movement. Call M.D. on call if any of these symptoms or signs occur.       Patient discussed with Dr. Kyleigh Fernando (OB Attending)    Julian Dyer MD  Family Medicine Resident

## 2021-03-15 ENCOUNTER — HOSPITAL ENCOUNTER (OUTPATIENT)
Dept: PERINATAL CARE | Age: 36
Discharge: HOME | End: 2021-03-15
Attending: OBSTETRICS & GYNECOLOGY
Payer: SUBSIDIZED

## 2021-03-15 ENCOUNTER — ROUTINE PRENATAL (OUTPATIENT)
Dept: FAMILY MEDICINE CLINIC | Age: 36
End: 2021-03-15

## 2021-03-15 VITALS
OXYGEN SATURATION: 98 % | BODY MASS INDEX: 46.72 KG/M2 | HEIGHT: 60 IN | HEART RATE: 89 BPM | RESPIRATION RATE: 16 BRPM | DIASTOLIC BLOOD PRESSURE: 74 MMHG | WEIGHT: 238 LBS | TEMPERATURE: 97.5 F | SYSTOLIC BLOOD PRESSURE: 114 MMHG

## 2021-03-15 DIAGNOSIS — O40.3XX0 POLYHYDRAMNIOS IN THIRD TRIMESTER COMPLICATION, SINGLE OR UNSPECIFIED FETUS: ICD-10-CM

## 2021-03-15 DIAGNOSIS — Z28.39 RUBELLA NON-IMMUNE STATUS, ANTEPARTUM: ICD-10-CM

## 2021-03-15 DIAGNOSIS — O09.899 RUBELLA NON-IMMUNE STATUS, ANTEPARTUM: ICD-10-CM

## 2021-03-15 DIAGNOSIS — Z3A.38 38 WEEKS GESTATION OF PREGNANCY: Primary | ICD-10-CM

## 2021-03-15 DIAGNOSIS — O24.419 GDM, CLASS A2: ICD-10-CM

## 2021-03-15 DIAGNOSIS — O09.30 LATE PRENATAL CARE: ICD-10-CM

## 2021-03-15 DIAGNOSIS — B37.31 VAGINAL CANDIDIASIS: ICD-10-CM

## 2021-03-15 DIAGNOSIS — O09.523 MULTIGRAVIDA OF ADVANCED MATERNAL AGE IN THIRD TRIMESTER: ICD-10-CM

## 2021-03-15 DIAGNOSIS — N89.8 VAGINAL DISCHARGE: ICD-10-CM

## 2021-03-15 DIAGNOSIS — O09.529 ANTEPARTUM MULTIGRAVIDA OF ADVANCED MATERNAL AGE: ICD-10-CM

## 2021-03-15 DIAGNOSIS — R82.71 GBS BACTERIURIA: ICD-10-CM

## 2021-03-15 LAB
CHLAMYDIA, EXTERNAL: NEGATIVE
N. GONORRHEA, EXTERNAL: NEGATIVE
WET MOUNT POCT, WMPOCT: NORMAL

## 2021-03-15 PROCEDURE — 76819 FETAL BIOPHYS PROFIL W/O NST: CPT | Performed by: OBSTETRICS & GYNECOLOGY

## 2021-03-15 PROCEDURE — 87210 SMEAR WET MOUNT SALINE/INK: CPT | Performed by: STUDENT IN AN ORGANIZED HEALTH CARE EDUCATION/TRAINING PROGRAM

## 2021-03-15 PROCEDURE — 0502F SUBSEQUENT PRENATAL CARE: CPT | Performed by: STUDENT IN AN ORGANIZED HEALTH CARE EDUCATION/TRAINING PROGRAM

## 2021-03-15 NOTE — PROGRESS NOTES
I reviewed with the resident the medical history and the resident's findings on the physical examination. I discussed with the resident the patient's diagnosis and concur with the plan. 36yo [id number] @ 38w0d by 29wk scan   1. IUP: RH pos, s/p flu and tdap, Covid today   2. GDM with poly: log better on NPH 10/16. Discussed risks of uncontrolled glucose including but not limited to macrosomia, birth injury, increased need for , birth defects, miscarriage/fetal demise,  hypoglycemia, persistent diabetes after pregnancy. EFW 50th%, weekly visits with MFM. Scheduled for IOL at 39 wk - baltazar on 3/22. 3.  Obesity: counseled on appropriate weight gain  4. AMA: ASA   5. Extra digit seen on baby: NIPT wnl   6. Rubella NI: offer vaccine PP   7. GBS bacteriuria: abx at delivery   8.   Late to care    Referred to [facility]

## 2021-03-15 NOTE — PROGRESS NOTES
Return OB Visit       Subjective:   Sandy Salinas 43 y.o.   LAURE: 3/29/2021, by Ultrasound  GA: 38w0d     LOF: no  Vaginal bleeding: no  Fetal movement (after 20 weeks): yes  Contractions: 8 per day. PNV: yes    GDMA2: compliant with NPH 10U AM and 16U PM  - Fastin-87  - Breakfast pp: 81-98  - Lunch pp:  (130 x2)  - Dinner pp: Josefina Morris    Was admitted to observation in L&D on 3/11/21 for brown vaginal discharge and rule-out labor. Had irregular contractions and SVE FT/20%/30 (unchanged on recheck), NST cat 1. Continuing to have similar scant vaginal discharge. No odor, dysuria, hematuria, fever, chills. OB History    Para Term  AB Living   4 3 3     3   SAB TAB Ectopic Molar Multiple Live Births             3      # Outcome Date GA Lbr Luc/2nd Weight Sex Delivery Anes PTL Lv   4 Current            3 Term 06/07/15 37w0d  8 lb (3.629 kg) F Vag-Spont   ANSON   2 Term 12 37w0d  8 lb (3.629 kg) M Vag-Spont   ANSON   1 Term 03 38w0d  7 lb (3.175 kg) M Vag-Spont   ANSON      Obstetric Comments   First 3 babies all born in [facility]        Allergies- reviewed:   No Known Allergies  Medications- reviewed:   Current Outpatient Medications   Medication Sig    aspirin delayed-release 81 mg tablet Take 1 Tab by mouth daily for 90 days.  insulin NPH (NOVOLIN N, HUMULIN N) 100 unit/mL injection Please administer 30 units of NPH in the morning and 10 units in the evening. (Patient taking differently: Please administer 10 units of NPH in the morning and 16 units in the evening.)    Insulin Needles, Disposable, 31 gauge x 5/16\" ndle Please administer 30 units of NPH in the morning and 10 units in the evening.  insulin syringe,safetyneedle (BD SafetyGlide Insulin Syringe) 0.5 mL 31 gauge x 15/64\" syrg 30 Units Every morning AND 10 Units every evening.  PNV with Ca No.65-Iron Poly-FA 60 mg iron-1 mg cap Take  by mouth. No current facility-administered medications for this visit. Past Medical History- reviewed:  Past Medical History:   Diagnosis Date    Abnormal Papanicolaou smear of cervix     Diabetes ([facility] [address]     Gestational diabetes     History of abnormal cervical Pap smear 2017     Past Surgical History- reviewed:   History reviewed. No pertinent surgical history.   Social History- reviewed:  Social History     Socioeconomic History    Marital status: SINGLE     Spouse name: Not on file    Number of children: Not on file    Years of education: Not on file    Highest education level: Not on file   Occupational History    Not on file   Social Needs    Financial resource strain: Not on file    Food insecurity     Worry: Not on file     Inability: Not on file    Transportation needs     Medical: Not on file     Non-medical: Not on file   Tobacco Use    Smoking status: Never Smoker    Smokeless tobacco: Never Used   Substance and Sexual Activity    Alcohol use: Never     Frequency: Never     Binge frequency: Never    Drug use: Never    Sexual activity: Yes     Partners: Male   Lifestyle    Physical activity     Days per week: Not on file     Minutes per session: Not on file    Stress: Not on file   Relationships    Social connections     Talks on phone: Not on file     Gets together: Not on file     Attends Tenriism service: Not on file     Active member of club or organization: Not on file     Attends meetings of clubs or organizations: Not on file     Relationship status: Not on file    Intimate partner violence     Fear of current or ex partner: Not on file     Emotionally abused: Not on file     Physically abused: Not on file     Forced sexual activity: Not on file   Other Topics Concern     Service Not Asked    Blood Transfusions Not Asked    Caffeine Concern Not Asked    Occupational Exposure Not Asked   Roby Pair Hazards Not Asked    Sleep Concern Not Asked    Stress Concern Not Asked    Weight Concern Not Asked    Special Diet Not Asked    Back Care Not Asked    Exercise Not Asked    Bike Helmet Not Asked   [address] [facility] Not Asked    Self-Exams Not Asked   Social History Narrative    Patient used to work in a [employer]- reviewed:   Immunization History   Administered Date(s) Administered    Influenza Vaccine (Quad) PF (>6 Mo Flulaval, Fluarix, and >3 Yrs Afluria, Fluzone 10390) 01/20/2021    Tdap 01/22/2021       Objective:     Visit Vitals  /74 (BP 1 Location: Right upper arm, BP Patient Position: Sitting)   Pulse 89   Temp 97.5 °F (36.4 °C) (Temporal)   Resp 16   Ht 5' (1.524 m)   Wt 238 lb (108 kg)   LMP 07/15/2020 (Approximate)   SpO2 98%   BMI 46.48 kg/m²       Physical Exam:  General: NAD  Cardiac: RRR, no murmurs, rubs, or gallops  Respiratory: CTA b/l, no wheezes, rhonchi, rales  Abdomen: gravid, fundal height 45cm, FHT present at 145  Extremities: No LE tenderness or edema. Psych: Normal mood and affect. SVE: 1-2 cm    Labs  No results found for this or any previous visit (from the past 12 hour(s)). Assessment         ICD-10-CM ICD-9-CM    1. 38 weeks gestation of pregnancy  Z3A.38 V22.2 NOVEL CORONAVIRUS (COVID-19)      AMB POC SMEAR, STAIN & INTERPRET, WET MOUNT   2. GDM, class A2  O24.419 648.80      V58.67    3. Polyhydramnios in third trimester complication, single or unspecified fetus  O40. 3XX0 657.03    4. GBS bacteriuria  R82.71 599.0 AMB POC SMEAR, STAIN & INTERPRET, WET MOUNT     041.02    5. Multigravida of advanced maternal age in third trimester  O09.523 65.56    6. Late prenatal care  O09.30 V23.7 AMB POC SMEAR, STAIN & INTERPRET, WET MOUNT   7. Antepartum multigravida of advanced maternal age  O12.46 26.63    6. Rubella non-immune status, antepartum  O99.891 646.83     Z28.3 V15.83    9. Vaginal discharge  N89.8 623.5 CHLAMYDIA/GC PCR      AMB POC SMEAR, STAIN & INTERPRET, WET MOUNT      CANCELED: AMB POC SMEAR, STAIN & INTERPRET, WET MOUNT   10.  Vaginal candidiasis  B37.3 112.1 AMB POC Willian Or, WET MOUNT         Plan   43 y.o.  at 38w0d LAURE 3/29/2021, by Ultrasound (29w) here for return OB visit     IUP: at 38w0d   - PNL: AB+, Ab neg, Hgb frc neg, HIV NR, RPR NR, HBsAg neg, GC/CT neg, VZV immune, Rubella nonimmune, pap NILM, GTT failed. - NIPT low risk  - S/p flu, Tdap  - US at 38w0d: EFW AGA (50% at 36w scan), anterior placenta, normal anatomy except extra small post-axial digit on left hand. Cephalic.  - SVE today 1-2 cm, unchanged from 3/11  - COVID screen collected today  - Scheduled for IOL at 39w, will place baltazar on 3/22 3:30PM (patient informed today)    GDMA2: Compliant with insulin. BG log mostly at goal. EFW 50% at 36w scan, BPP 8  - Continue NPH 10U AM, and 16U PM  - Continue glucose log QID  - Surveillance scan done today w/ MFM at 10:15AM     Yeast Infection: wet mount with budding yeast  - Will treat with vaginal clotrimazole 2% for 3 days  - Will also check GC/Chlamydia    Mild Polyhydramnios: TIMOTHY 26.8 on 38w scan  - Surveillance scan w/ MFM done today at 10:15 AM    GBS bacteruria: on initial ucx, s/p amox  - Will need antepartum ppx    Extra digit on baby: seen on anatomy scan. NIPT low risk. FOB and patient's daughter have hx of polydactylyl. Maternal Obesity: EFW 50% at 36w scan  - ASA    AMA: NIPT low risk  - ASA    Late to care: at 23w. initial UDS neg    Rubella nonimmune:   - Offer vaccine postpartum    BV: noted on pap smear, s/p metrogel     Continuity Provider: Dionicio Villa (seen with BENJA Lemra)Mayelin       Orders Placed This Encounter    CHLAMYDIA/GC PCR ([facility])     Order Specific Question:   Sample source     Answer:   Urine [258]     Order Specific Question:   Specimen source     Answer:   Urine [258]    NOVEL CORONAVIRUS (COVID-19) ([facility] Default)     Scheduling Instructions:      1) Due to current limited availability of the COVID-19 PCR test, tests will be prioritized and may not be completed.   2) Order only if the test result will change clinical management or necessary for a return to mission-critical employment decision.              3) Print and instruct patient to adhere to CDC home isolation program. (Link Above)              4) Set up or refer patient for a monitoring program.              5) Have patient sign up for and leverage MyChart (if not previously done). Order Specific Question:   Is this test for diagnosis or screening? Answer:   Screening     Order Specific Question:   Symptomatic for COVID-19 as defined by CDC? Answer:   No     Order Specific Question:   Hospitalized for COVID-19? Answer:   No     Order Specific Question:   Admitted to ICU for COVID-19? Answer:   No     Order Specific Question:   Employed in healthcare setting? Answer:   No     Order Specific Question:   Resident in a congregate (group) care setting? Answer:   No     Order Specific Question:   Pregnant? Answer:   Yes     Order Specific Question:   Previously tested for COVID-19? Answer:   Unknown    AMB POC WET PREP (AKA STAIN, INTERPRET, WET MOUNT)     Labor precautions discussed, including: Regular painful contractions, lasting for greater than one hour, taking your breath away; any vaginal bleeding; any leakage of fluid; or absent or decreased fetal movement. Call M.D. on call if any of these symptoms or signs occur. I have discussed the diagnosis with the patient and the intended plan as seen in the above orders. The patient has received an after-visit summary and questions were answered concerning future plans. I have discussed medication side effects and warnings with the patient as well. Informed pt to return to the office or go to the ER if she experiences vaginal bleeding, vaginal discharge, leaking of fluid, pelvic cramping.     Pt seen and discussed with Dr. Brenton Lopez (attending physician)    Joshua Gaona DO  Family Medicine Resident

## 2021-03-15 NOTE — PROGRESS NOTES
Return OB Visit Subjective:  
Perfecto Manley 43 y.o.   LAURE: 3/29/2021, by Ultrasound  GA: 38w0d LOF: no 
Vaginal bleeding: no 
Fetal movement (after 20 weeks): yes Contractions: 8 per day. PNV: yes GDMA2: compliant with NPH 10U AM and 16U PM 
- Fastin-87 
- Breakfast pp: 81-98 - Lunch pp:  (130 x2) - Dinner pp:  Was admitted to observation in L&D on 3/11/21 for brown vaginal discharge and rule-out labor. Had irregular contractions and SVE FT/20%/30 (unchanged on recheck), NST cat 1. Continuing to have similar scant vaginal discharge. No odor, dysuria, hematuria, fever, chills. OB History  Para Term  AB Living 4 3 3     3 SAB TAB Ectopic Molar Multiple Live Births 3  
  
# Outcome Date GA Lbr Luc/2nd Weight Sex Delivery Anes PTL Lv  
4 Current 3 Term 06/07/15 37w0d  8 lb (3.629 kg) F Vag-Spont   ANSON  
2 Term 12 37w0d  8 lb (3.629 kg) M Vag-Spont   ANSON  
1 Term 03 38w0d  7 lb (3.175 kg) M Vag-Spont   ANSON Obstetric Comments First 3 babies all born in [facility] Allergies- reviewed:  
No Known Allergies Medications- reviewed:  
Current Outpatient Medications Medication Sig  
 aspirin delayed-release 81 mg tablet Take 1 Tab by mouth daily for 90 days.  insulin NPH (NOVOLIN N, HUMULIN N) 100 unit/mL injection Please administer 30 units of NPH in the morning and 10 units in the evening. (Patient taking differently: Please administer 10 units of NPH in the morning and 16 units in the evening.)  Insulin Needles, Disposable, 31 gauge x 5/16\" ndle Please administer 30 units of NPH in the morning and 10 units in the evening.  insulin syringe,safetyneedle (BD SafetyGlide Insulin Syringe) 0.5 mL 31 gauge x 15/64\" syrg 30 Units Every morning AND 10 Units every evening.  PNV with Ca No.65-Iron Poly-FA 60 mg iron-1 mg cap Take  by mouth. No current facility-administered medications for this visit. Past Medical History- reviewed: 
Past Medical History:  
Diagnosis Date  Abnormal Papanicolaou smear of cervix  Diabetes ([facility] [address]  Gestational diabetes  History of abnormal cervical Pap smear 2017 Past Surgical History- reviewed:  
No past surgical history on file. Social History- reviewed: 
Social History Socioeconomic History  Marital status: SINGLE Spouse name: Not on file  Number of children: Not on file  Years of education: Not on file  Highest education level: Not on file Occupational History  Not on file Social Needs  Financial resource strain: Not on file  Food insecurity Worry: Not on file Inability: Not on file  Transportation needs Medical: Not on file Non-medical: Not on file Tobacco Use  Smoking status: Never Smoker  Smokeless tobacco: Never Used Substance and Sexual Activity  Alcohol use: Never Frequency: Never Binge frequency: Never  Drug use: Never  Sexual activity: Yes  
  Partners: Male Lifestyle  Physical activity Days per week: Not on file Minutes per session: Not on file  Stress: Not on file Relationships  Social connections Talks on phone: Not on file Gets together: Not on file Attends Sabianist service: Not on file Active member of club or organization: Not on file Attends meetings of clubs or organizations: Not on file Relationship status: Not on file  Intimate partner violence Fear of current or ex partner: Not on file Emotionally abused: Not on file Physically abused: Not on file Forced sexual activity: Not on file Other Topics Concern [address] Service Not Asked  Blood Transfusions Not Asked  Caffeine Concern Not Asked  Occupational Exposure Not Asked Kaye Gama Hazards Not Asked  Sleep Concern Not Asked  Stress Concern Not Asked  Weight Concern Not Asked  Special Diet Not Asked  Back Care Not Asked  Exercise Not Asked  Bike Helmet Not Asked  Seat Belt Not Asked  Self-Exams Not Asked Social History Narrative Patient used to work in a [employer] Immunizations- reviewed:  
Immunization History Administered Date(s) Administered  Influenza Vaccine [place]) PF (>6 Mo Flulaval, Fluarix, and >3 Yrs [place], Fluzone 34794) 2021  Tdap 2021 Objective:  
 
Visit Vitals LMP 07/15/2020 (Approximate) Physical Exam: 
General: NAD Cardiac: RRR, no murmurs, rubs, or gallops Respiratory: CTA b/l, no wheezes, rhonchi, rales Abdomen: gravid, fundal height 45cm, FHT present at 145 Extremities: No LE tenderness or edema. Psych: Normal mood and affect. SVE: 1-2 cm Labs No results found for this or any previous visit (from the past 12 hour(s)). Assessment ICD-10-CM ICD-9-CM 1. 38 weeks gestation of pregnancy  Z3A.38 V22.2 NOVEL CORONAVIRUS (COVID-19) 2. GDM, class A2  O24.419 648.80 V58.67 3. Polyhydramnios in third trimester complication, single or unspecified fetus  O40. 3XX0 657.03   
4. GBS bacteriuria  R82.71 599.0   
  041.02   
5. Multigravida of advanced maternal age in third trimester  O09.523 65.56   
6. Late prenatal care  O09.30 V23.7 7. Antepartum multigravida of advanced maternal age  O12.46 12.61   
8. Rubella non-immune status, antepartum  O99.891 646.83   
 Z28.3 V15.83 Plan  
43 y.o.  at 38w0d LAURE 3/29/2021, by Ultrasound (29w) here for return OB visit IUP: at 38w0d  
- PNL: AB+, Ab neg, Hgb frc neg, HIV NR, RPR NR, HBsAg neg, GC/CT neg, VZV immune, Rubella nonimmune, pap NILM, GTT failed. - NIPT low risk - S/p flu, Tdap 
- US at 37w0d: EFW AGA (50% at 36w scan), anterior placenta, normal anatomy except extra small post-axial digit on left hand 
- SVE today 1-2 cm, unchanged from 3/11 
- COVID screen collected today 
- Scheduled for IOL at 39w, will place baltazar on 3/22 (patient informed today***) - Follow up on *** 
 
GDMA2: Compliant with insulin. BG log mostly at goal. EFW 50% at 36w scan, BPP 8/8 
- Continue NPH 10U AM, and 16U PM 
- Continue glucose log QID 
- Surveillance scan done today w/ MFM at 10:15AM  
 
Mild Polyhydramnios: TIMOTHY 27.9 on 37w scan - Surveillance scan w/ MFM done today at 10:15 AM 
 
GBS bacteruria: on initial ucx, s/p amox - Will need antepartum ppx Extra digit on baby: seen on anatomy scan. NIPT low risk. FOB and patient's daughter have hx of polydactylyl. Maternal Obesity: EFW 50% at 36w scan - ASA AMA: NIPT low risk - ASA Late to care: at 23w. initial UDS neg Rubella nonimmune: - Offer vaccine postpartum BV: noted on pap smear, s/p metrogel Continuity Provider: Roque De La Rosa (seen with BENJA Arana), Jayce Spain No orders of the defined types were placed in this encounter. Labor precautions discussed, including: Regular painful contractions, lasting for greater than one hour, taking your breath away; any vaginal bleeding; any leakage of fluid; or absent or decreased fetal movement. Call M.D. on call if any of these symptoms or signs occur. I have discussed the diagnosis with the patient and the intended plan as seen in the above orders. The patient has received an after-visit summary and questions were answered concerning future plans. I have discussed medication side effects and warnings with the patient as well. Informed pt to return to the office or go to the ER if she experiences vaginal bleeding, vaginal discharge, leaking of fluid, pelvic cramping. Pt seen and discussed with Dr. Lorraine Umana (attending physician) Nathalie Sauceda DO Family Medicine Resident

## 2021-03-15 NOTE — PATIENT INSTRUCTIONS
Semana 38 de lara embarazo: Instrucciones de cuidado  Week 38 of Your Pregnancy: Care Instructions  Instrucciones de cuidado    Aunque no lo crea, lara bebé está por nacer. Es posible que ya tenga ideas acerca de la personalidad de lara bebé debido a cuánto se mueve. O oswald vez haya notado cómo responde a los sonidos, al calor, al frío y a la lizett. Incluso podría saber qué tipo de [place] a lara bebé. A estas [place], usted tiene [address] idea de qué puede esperar stephie el [place]. Es posible que haya hablado de madiha preferencias del nacimiento con lara médico. Amna incluso si usted quiere un nacimiento por vía vaginal, es debby buena idea aprender [employer] nacimientos por cesárea. Los partos por cesárea son [employer] bebés nacen por medio de un josh (incisión) hecho en la parte inferior del abdomen. A veces, es la mejor opción para la kiran del bebé y de [place]. Esta hoja de cuidados puede ayudarla a entender los nacimientos por cesárea. También le da información sobre qué esperar después del nacimiento de lara bebé. Y la ayuda a comprender [employer] depresión posparto. La atención de seguimiento es debby parte clave de lara tratamiento y seguridad. Asegúrese de hacer y acudir a todas las citas, y llame a lara médico si está teniendo problemas. También es debby buena idea saber los resultados de maidha exámenes y mantener debby lista de los medicamentos que ady. ¿Cómo puede cuidarse en el hogar? Aprenda sobre el parto por cesárea  · La mayoría de los partos por cesárea no están planeados. Se hacen por problemas que se producen stephie el trabajo de [place]. Estos problemas podrían incluir:  ? El [place] de parto se vuelve más lento o se detiene. ? Presión arterial tigre u otros problemas para la madre.  ? Señales de sufrimiento del bebé. Estas señales pueden incluir debby frecuencia cardíaca muy rápida o lenta.   · [place] y los bebés están fidelina después de un parto por Alexandra Lopez cesárea es debby Charlesetta Roberts. Tiene más riesgos que un parto vaginal.  · En algunos casos, un parto por cesárea planificado puede ser más seguro que un parto vaginal. [place] puede ser el kate si:  ? La madre tiene un problema de kiran, bladimir debby afección cardíaca. ? El bebé no está en posición con la jasmin para abajo para el parto. Esta posición se llama de nalgas. ? El útero tiene cicatrices de cirugías anteriores. [place]ica[place] podría aumentar la probabilidad de un desgarro en el Geeta Brunette. ? Hay un problema con la placenta. ? La madre tiene debby infección, bladimir herpes genital, que podría transmitirse al bebé. ? La madre está embarazada con mellizos o más bebés. ? El bebé pesa de 9 a 8 libras o más. · Debido a los riesgos del parto por cesárea, las cesáreas planeadas deberían hacerse generalmente solo por motivos médicos. Y debby cesárea planeada debería hacerse en la semana 44 o más tarde jet que haya un motivo médico para hacerla antes. Sepa lo que ocurrirá después del parto y cómo planificar las primeras semanas en lara hogar  · Usted, lara bebé y lara roxanna o instructor [place] bandas de identificación. Solo las personas que tengan bandas que coincidan podrán retirar al bebé de la danya de recién nacidos. · Aprenderá a alimentar a lara bebé, cambiar el pañal y bañar al bebé. Andrei Bucy a cuidar del muñón del cordón umbilical. Si [place] a circuncidar a lara bebé, también aprenderá a cuidar debby circuncisión. · Pídale a las visitas que esperen a visitarla cuando esté en lara hogar. Y pídales que se laven las kris antes de tocar al bebé. · Asegúrese de tener otro adulto en casa por lo menos 2 o 3 días después del [place]. · Benjamin las primeras 2 semanas, limite las visitas de familiares y amigos. · No permita visitantes que tengan resfriados o infecciones. Asegúrese de que todos los visitantes estén al día con madiha vacunas. Nunca deje que nadie fume cerca de lara bebé. · Trate de dormir debby siesta cuando lara bebé duerma.   Sea consciente de la depresión posparto  · La \"melancolía de la maternidad\" es común en las primeras 1 o 2 semanas después del [place]. Es posible que tenga ganas de llorar o se sienta la nena o irritable sin motivo alguno. · En algunas mujeres, estas emociones saleem más tiempo y son más intensas. A esto se lo conoce bladimir depresión posparto. · Si madiha síntomas saleem más de unas pocas semanas, o si se siente muy deprimida, pídale ayuda a lara médico.  · La depresión posparto sí puede tratarse. Los grupos de apoyo y la asesoría psicológica pueden ser de [facility]. A veces, los medicamentos también pueden ayudar. ¿Dónde puede encontrar más información en inglés? Vaya a http://www.gray.com/  Leonila B044 en la búsqueda para aprender más acerca de \"Semana 45 de lara embarazo: Instrucciones de cuidado. \"  Revisado: 11 febrero, 2020               Versión del contenido: 12.6  © 4876-8109 Healthwise, Incorporated. Las instrucciones de cuidado fueron adaptadas bajo licencia por [employer] (which disclaims liability or warranty for this information). Si usted tiene [place] afección médica o sobre estas instrucciones, siempre pregunte a lara profesional de kiran. [employer], [employer] niega toda garantía o responsabilidad por lara uso de esta información.

## 2021-03-15 NOTE — PROGRESS NOTES
Dede Moeller is a 43 y.o. female    Chief Complaint   Patient presents with    Routine Prenatal Visit     Patient is 38 weeks. She is not hvaing vaginal bleeding. She is having brown discharge with no odor or itchiness. She is taking prenatal vitamins. She is having fetal movemeent. She is having 8 contractions a day. [facility] thursday. No other concerns. 1. Have you been to the ER, urgent care clinic since your last visit? Hospitalized since your last visit? No  M  2. Have you seen or consulted any other health care providers outside of the [address] [place] [address] since your last visit? Include any pap smears or colon screening. No      Visit Vitals  /74 (BP 1 Location: Right upper arm, BP Patient Position: Sitting)   Pulse 89   Temp 97.5 °F (36.4 °C) (Temporal)   Resp 16   Ht 5' (1.524 m)   Wt 238 lb (108 kg)   SpO2 98%   BMI 46.48 kg/m²           Health Maintenance Due   Topic Date Due    Hepatitis C Screening  Never done    COVID-19 Vaccine (1) Never done         Medication Reconciliation completed, changes noted.   Please  Update medication list.

## 2021-03-17 LAB
C TRACH RRNA SPEC QL NAA+PROBE: NEGATIVE
N GONORRHOEA RRNA SPEC QL NAA+PROBE: NEGATIVE

## 2021-03-21 LAB — SARS-COV-2, COV2NT: NOT DETECTED

## 2021-03-22 ENCOUNTER — HOSPITAL ENCOUNTER (INPATIENT)
Age: 36
LOS: 2 days | Discharge: HOME | DRG: 560 | End: 2021-03-24
Attending: FAMILY MEDICINE | Admitting: OBSTETRICS & GYNECOLOGY
Payer: MEDICAID

## 2021-03-22 DIAGNOSIS — Z3A.39 39 WEEKS GESTATION OF PREGNANCY: ICD-10-CM

## 2021-03-22 LAB
BASOPHILS # BLD: 0 K/UL (ref 0–0.1)
BASOPHILS NFR BLD: 0 % (ref 0–1)
DIFFERENTIAL METHOD BLD: NORMAL
EOSINOPHIL # BLD: 0.1 K/UL (ref 0–0.4)
EOSINOPHIL NFR BLD: 1 % (ref 0–7)
ERYTHROCYTE [DISTWIDTH] IN BLOOD BY AUTOMATED COUNT: 12.3 % (ref 11.5–14.5)
GLUCOSE BLD STRIP.AUTO-MCNC: 30 MG/DL (ref 65–100)
GLUCOSE BLD STRIP.AUTO-MCNC: 69 MG/DL (ref 65–100)
HCT VFR BLD AUTO: 38.5 % (ref 35–47)
HGB BLD-MCNC: 12.9 G/DL (ref 11.5–16)
IMM GRANULOCYTES # BLD AUTO: 0 K/UL (ref 0–0.04)
IMM GRANULOCYTES NFR BLD AUTO: 0 % (ref 0–0.5)
LYMPHOCYTES # BLD: 2 K/UL (ref 0.8–3.5)
LYMPHOCYTES NFR BLD: 21 % (ref 12–49)
MCH RBC QN AUTO: 31.4 PG (ref 26–34)
MCHC RBC AUTO-ENTMCNC: 33.5 G/DL (ref 30–36.5)
MCV RBC AUTO: 93.7 FL (ref 80–99)
MONOCYTES # BLD: 0.8 K/UL (ref 0–1)
MONOCYTES NFR BLD: 8 % (ref 5–13)
NEUTS SEG # BLD: 6.8 K/UL (ref 1.8–8)
NEUTS SEG NFR BLD: 70 % (ref 32–75)
NRBC # BLD: 0 K/UL (ref 0–0.01)
NRBC BLD-RTO: 0 PER 100 WBC
PLATELET # BLD AUTO: 193 K/UL (ref 150–400)
PMV BLD AUTO: 11.3 FL (ref 8.9–12.9)
RBC # BLD AUTO: 4.11 M/UL (ref 3.8–5.2)
SERVICE CMNT-IMP: ABNORMAL
SERVICE CMNT-IMP: NORMAL
WBC # BLD AUTO: 9.8 K/UL (ref 3.6–11)

## 2021-03-22 PROCEDURE — 74011636637 HC RX REV CODE- 636/637: Performed by: STUDENT IN AN ORGANIZED HEALTH CARE EDUCATION/TRAINING PROGRAM

## 2021-03-22 PROCEDURE — 82962 GLUCOSE BLOOD TEST: CPT

## 2021-03-22 PROCEDURE — 65270000029 HC RM PRIVATE

## 2021-03-22 PROCEDURE — 75410000003 HC RECOV DEL/VAG/CSECN EA 0.5 HR: Performed by: OBSTETRICS & GYNECOLOGY

## 2021-03-22 PROCEDURE — 36415 COLL VENOUS BLD VENIPUNCTURE: CPT

## 2021-03-22 PROCEDURE — 75410000002 HC LABOR FEE PER 1 HR: Performed by: OBSTETRICS & GYNECOLOGY

## 2021-03-22 PROCEDURE — 85025 COMPLETE CBC W/AUTO DIFF WBC: CPT

## 2021-03-22 PROCEDURE — 75410000000 HC DELIVERY VAGINAL/SINGLE: Performed by: OBSTETRICS & GYNECOLOGY

## 2021-03-22 RX ORDER — OXYTOCIN/RINGER'S LACTATE 30/500 ML
0-20 PLASTIC BAG, INJECTION (ML) INTRAVENOUS
Status: DISCONTINUED | OUTPATIENT
Start: 2021-03-23 | End: 2021-03-23

## 2021-03-22 RX ORDER — INSULIN LISPRO 100 [IU]/ML
INJECTION, SOLUTION INTRAVENOUS; SUBCUTANEOUS EVERY 4 HOURS
Status: DISCONTINUED | OUTPATIENT
Start: 2021-03-22 | End: 2021-03-23

## 2021-03-22 RX ORDER — NALBUPHINE HYDROCHLORIDE 10 MG/ML
10 INJECTION, SOLUTION INTRAMUSCULAR; INTRAVENOUS; SUBCUTANEOUS
Status: DISCONTINUED | OUTPATIENT
Start: 2021-03-22 | End: 2021-03-23

## 2021-03-22 RX ORDER — NALBUPHINE HYDROCHLORIDE 10 MG/ML
5 INJECTION, SOLUTION INTRAMUSCULAR; INTRAVENOUS; SUBCUTANEOUS
Status: DISCONTINUED | OUTPATIENT
Start: 2021-03-22 | End: 2021-03-22

## 2021-03-22 RX ORDER — DEXTROSE 50 % IN WATER (D50W) INTRAVENOUS SYRINGE
25-50 AS NEEDED
Status: DISCONTINUED | OUTPATIENT
Start: 2021-03-22 | End: 2021-03-24 | Stop reason: HOSPADM

## 2021-03-22 RX ORDER — MAGNESIUM SULFATE 100 %
4 CRYSTALS MISCELLANEOUS AS NEEDED
Status: DISCONTINUED | OUTPATIENT
Start: 2021-03-22 | End: 2021-03-24 | Stop reason: HOSPADM

## 2021-03-22 RX ADMIN — HUMAN INSULIN 8 UNITS: 100 INJECTION, SUSPENSION SUBCUTANEOUS at 23:28

## 2021-03-22 NOTE — H&P
[address] [facility] [address] [place] [address]   Office [phone], Fax [phone]      History & Physical    Name: Teresa Guzman MRN: [id number]  SSN: [id number]    YOB: 1985  Age: 43 y.o. Sex: female      Subjective:     Estimated Date of Delivery: 3/29/21  OB History    Para Term  AB Living   4 3 3     3   SAB TAB Ectopic Molar Multiple Live Births             3      # Outcome Date GA Lbr Luc/2nd Weight Sex Delivery Anes PTL Lv   4 Current            3 Term 06/07/15 37w0d  8 lb (3.629 kg) F Vag-Spont   ANSON   2 Term 12 37w0d  8 lb (3.629 kg) M Vag-Spont   ANSON   1 Term 03 38w0d  7 lb (3.175 kg) M Vag-Spont   ANSON      Obstetric Comments   First 3 babies all born in [address] [place] [address]        Ms. Brittni Martins is admitted with pregnancy at 39w0d for induction of labor due to gestational diabetes. Prenatal course was complicated by GDMA2, mild polyhydramnios, GBS bacteruria, obesity, AMA, Late to prenatal care, rubella nonimmune, treated BV& yeast infection. Please see prenatal records for details. Endorses mild intermittent contractions and fetal movement. Denies all acute URTI sx, vaginal bleeding, leakage of fluid. Past Medical History:   Diagnosis Date    Abnormal Papanicolaou smear of cervix     Diabetes ([facility] [address]     Gestational diabetes     History of abnormal cervical Pap smear      No past surgical history on file.   Social History     Occupational History    Not on file   Tobacco Use    Smoking status: Never Smoker    Smokeless tobacco: Never Used   Substance and Sexual Activity    Alcohol use: Never     Frequency: Never     Binge frequency: Never    Drug use: Never    Sexual activity: Yes     Partners: Male     Family History   Problem Relation Age of Onset    No Known Problems Mother     Cancer Father     No Known Problems Sister     No Known Problems Brother     No Known Problems Maternal Grandmother     No Known Problems Maternal Grandfather     No Known Problems Paternal Grandmother     No Known Problems Paternal Grandfather        No Known Allergies  Prior to Admission medications    Medication Sig Start Date End Date Taking? Authorizing Provider   aspirin delayed-release 81 mg tablet Take 1 Tab by mouth daily for 90 days. 2/22/21 5/23/21  Hima Luevano,    insulin NPH (NOVOLIN N, HUMULIN N) 100 unit/mL injection Please administer 30 units of NPH in the morning and 10 units in the evening. Patient taking differently: Please administer 10 units of NPH in the morning and 16 units in the evening. 2/1/21   Jocelin Hernández MD   Insulin Needles, Disposable, 31 gauge x 5/16\" ndle Please administer 30 units of NPH in the morning and 10 units in the evening. 2/1/21   Jocelin Hernández MD   insulin syringe,safetyneedle (BD SafetyGlide Insulin Syringe) 0.5 mL 31 gauge x 15/64\" syrg 30 Units Every morning AND 10 Units every evening. 2/1/21   Jocelin Hernández MD   PNV with Ca No.65-Iron Poly-FA 60 mg iron-1 mg cap Take  by mouth. Provider, Historical        Review of Systems: A comprehensive review of systems was negative except for that written in the History of Present Illness. Objective:     Vitals: There were no vitals filed for this visit. Physical Exam:  Patient without distress.   Heart: Regular rate and rhythm, S1S2 present or without murmur or extra heart sounds  Lung: clear to auscultation throughout lung fields, no wheezes, no rales, no rhonchi and normal respiratory effort  Abdomen: soft, nontender  Fundus: soft and non tender  Perineum: blood absent, amniotic fluid absent  Cervical Exam: 2.5/50/-3  Lower Extremities:  - Pedal pitting Edema 1+ b/l  Membranes:  Intact  Fetal Heart Rate: Reactive  Baseline: 130 per minute  Variability: moderate  Accelerations: yes  Decelerations: none  Uterine contractions: irregular, every 7 minutes          Prenatal Labs:   PNL: AB+, Ab neg, Hgb frc neg, HIV NR, RPR NR, HBsAg neg, GC/CT neg, VZV immune, Rubella nonimmune, pap NILM, GTT failed. Covid neg. Impression/Plan:     Ms. Chaparro Stewart is a 42 yo [id number] admitted with pregnancy at 39w0d for induction of labor due to diabetes. Plan:   IOL: 39w0d for induction of labor due to [address] [address] tse L&D  - VS per unit routine  - Smith score 6, will not insert cook catheter  - Pit to start at 5am per protocol  - Continuous fetal & contraction monitoring   - FHR cat 1 tracing  - Cephalic presentation confirmed by bedside ultrasound  - Nubain 10mg q3H prn, Phenergan 12.5mg q6H prn     IUP   - PNL: AB+, Ab neg, Hgb frc neg, HIV NR, RPR NR, HBsAg neg, GC/CT neg, VZV immune, Rubella nonimmune, pap NILM, GTT failed. Covid neg.   - NIPT low risk  - S/p flu, Tdap  - US at 38w0d: EFW AGA (50% at 36w scan), anterior placenta, normal anatomy except extra small post-axial digit on left hand. Cephalic.     GDMA2: insulin controlled. On NPH 10U AM, 16U at night. EFW 50% at 36w scan, BPP 8/8  - NPH 8U PM (50% home dose)  - POC glu q4H during latent labor, q2H during active labor  - SSI     GBS Bacteruria: on initial urine culture, treated with Amoxicillin   - PCN G to start with pitocin    Mild Polyhydramnios: TIMOTHY 26.8 on 38w scan    Extra digit on baby: seen on anatomy scan. NIPT low risk. FOB and patient's daughter have hx of polydactylyl.      Maternal Obesity: EFW 50% at 36w scan  - ASA     AMA: NIPT low risk  - ASA     Late to care: at 23w. initial UDS neg     Rubella nonimmune:   - Offer vaccine postpartum    Yeast Infection: Treated with vaginal clotrimazole 2% for 3 days     BV: noted on pap smear, s/p metrogel         Patient seen and examined with Dr. Ibeth Will.      Signed By:  Peter Kaur MD  Family Medicine Resident

## 2021-03-23 PROBLEM — Z34.90 PREGNANCY: Status: ACTIVE | Noted: 2021-03-23

## 2021-03-23 LAB
GLUCOSE BLD STRIP.AUTO-MCNC: 69 MG/DL (ref 65–100)
GLUCOSE BLD STRIP.AUTO-MCNC: 72 MG/DL (ref 65–100)
SERVICE CMNT-IMP: NORMAL
SERVICE CMNT-IMP: NORMAL

## 2021-03-23 PROCEDURE — 59410 OBSTETRICAL CARE: CPT | Performed by: OBSTETRICS & GYNECOLOGY

## 2021-03-23 PROCEDURE — 74011250636 HC RX REV CODE- 250/636: Performed by: OBSTETRICS & GYNECOLOGY

## 2021-03-23 PROCEDURE — 75410000002 HC LABOR FEE PER 1 HR: Performed by: OBSTETRICS & GYNECOLOGY

## 2021-03-23 PROCEDURE — 65270000029 HC RM PRIVATE

## 2021-03-23 PROCEDURE — 74011000258 HC RX REV CODE- 258: Performed by: OBSTETRICS & GYNECOLOGY

## 2021-03-23 PROCEDURE — 82962 GLUCOSE BLOOD TEST: CPT

## 2021-03-23 PROCEDURE — 74011250636 HC RX REV CODE- 250/636: Performed by: STUDENT IN AN ORGANIZED HEALTH CARE EDUCATION/TRAINING PROGRAM

## 2021-03-23 PROCEDURE — 74011250637 HC RX REV CODE- 250/637: Performed by: STUDENT IN AN ORGANIZED HEALTH CARE EDUCATION/TRAINING PROGRAM

## 2021-03-23 RX ORDER — SODIUM CHLORIDE, SODIUM LACTATE, POTASSIUM CHLORIDE, CALCIUM CHLORIDE 600; 310; 30; 20 MG/100ML; MG/100ML; MG/100ML; MG/100ML
125 INJECTION, SOLUTION INTRAVENOUS CONTINUOUS
Status: DISCONTINUED | OUTPATIENT
Start: 2021-03-23 | End: 2021-03-23

## 2021-03-23 RX ORDER — IBUPROFEN 800 MG/1
800 TABLET ORAL
Qty: 30 TAB | Refills: 0 | Status: SHIPPED | OUTPATIENT
Start: 2021-03-23

## 2021-03-23 RX ORDER — HYDROCODONE BITARTRATE AND ACETAMINOPHEN 5; 325 MG/1; MG/1
1 TABLET ORAL
Status: DISCONTINUED | OUTPATIENT
Start: 2021-03-23 | End: 2021-03-24 | Stop reason: HOSPADM

## 2021-03-23 RX ORDER — OXYTOCIN/RINGER'S LACTATE 30/500 ML
10 PLASTIC BAG, INJECTION (ML) INTRAVENOUS AS NEEDED
Status: DISCONTINUED | OUTPATIENT
Start: 2021-03-23 | End: 2021-03-24 | Stop reason: HOSPADM

## 2021-03-23 RX ORDER — SODIUM CHLORIDE 0.9 % (FLUSH) 0.9 %
5-40 SYRINGE (ML) INJECTION AS NEEDED
Status: DISCONTINUED | OUTPATIENT
Start: 2021-03-23 | End: 2021-03-23

## 2021-03-23 RX ORDER — SILVER NITRATE 38.21; 12.74 MG/1; MG/1
1 STICK TOPICAL
Status: DISCONTINUED | OUTPATIENT
Start: 2021-03-23 | End: 2021-03-23

## 2021-03-23 RX ORDER — OXYTOCIN/RINGER'S LACTATE 30/500 ML
0-20 PLASTIC BAG, INJECTION (ML) INTRAVENOUS
Status: DISCONTINUED | OUTPATIENT
Start: 2021-03-23 | End: 2021-03-23

## 2021-03-23 RX ORDER — NALOXONE HYDROCHLORIDE 0.4 MG/ML
0.4 INJECTION, SOLUTION INTRAMUSCULAR; INTRAVENOUS; SUBCUTANEOUS AS NEEDED
Status: DISCONTINUED | OUTPATIENT
Start: 2021-03-23 | End: 2021-03-24 | Stop reason: HOSPADM

## 2021-03-23 RX ORDER — ACETAMINOPHEN 325 MG/1
650 TABLET ORAL
Status: DISCONTINUED | OUTPATIENT
Start: 2021-03-23 | End: 2021-03-24 | Stop reason: HOSPADM

## 2021-03-23 RX ORDER — DOCUSATE SODIUM 100 MG/1
100 CAPSULE, LIQUID FILLED ORAL
Qty: 30 CAP | Refills: 0 | Status: SHIPPED | OUTPATIENT
Start: 2021-03-23

## 2021-03-23 RX ORDER — DOCUSATE SODIUM 100 MG/1
100 CAPSULE, LIQUID FILLED ORAL
Status: DISCONTINUED | OUTPATIENT
Start: 2021-03-23 | End: 2021-03-24 | Stop reason: HOSPADM

## 2021-03-23 RX ORDER — LIDOCAINE HYDROCHLORIDE 10 MG/ML
INJECTION INFILTRATION; PERINEURAL
Status: DISCONTINUED
Start: 2021-03-23 | End: 2021-03-23

## 2021-03-23 RX ORDER — OXYTOCIN/RINGER'S LACTATE 30/500 ML
87.3 PLASTIC BAG, INJECTION (ML) INTRAVENOUS AS NEEDED
Status: DISCONTINUED | OUTPATIENT
Start: 2021-03-23 | End: 2021-03-24 | Stop reason: HOSPADM

## 2021-03-23 RX ORDER — FENTANYL/BUPIVACAINE/NS/PF 2-1250MCG
1-16 PREFILLED PUMP RESERVOIR EPIDURAL CONTINUOUS
Status: DISCONTINUED | OUTPATIENT
Start: 2021-03-23 | End: 2021-03-23

## 2021-03-23 RX ORDER — IBUPROFEN 800 MG/1
800 TABLET ORAL EVERY 8 HOURS
Status: DISCONTINUED | OUTPATIENT
Start: 2021-03-23 | End: 2021-03-24 | Stop reason: HOSPADM

## 2021-03-23 RX ORDER — SODIUM CHLORIDE 0.9 % (FLUSH) 0.9 %
5-40 SYRINGE (ML) INJECTION EVERY 8 HOURS
Status: DISCONTINUED | OUTPATIENT
Start: 2021-03-23 | End: 2021-03-23

## 2021-03-23 RX ORDER — EPHEDRINE SULFATE/0.9% NACL/PF 50 MG/5 ML
10 SYRINGE (ML) INTRAVENOUS
Status: DISCONTINUED | OUTPATIENT
Start: 2021-03-23 | End: 2021-03-23

## 2021-03-23 RX ADMIN — OXYTOCIN 2 MILLI-UNITS/MIN: 10 INJECTION, SOLUTION INTRAMUSCULAR; INTRAVENOUS at 05:05

## 2021-03-23 RX ADMIN — SODIUM CHLORIDE, POTASSIUM CHLORIDE, SODIUM LACTATE AND CALCIUM CHLORIDE 125 ML/HR: 600; 310; 30; 20 INJECTION, SOLUTION INTRAVENOUS at 04:55

## 2021-03-23 RX ADMIN — SODIUM CHLORIDE 5 MILLION UNITS: 900 INJECTION INTRAVENOUS at 04:55

## 2021-03-23 RX ADMIN — IBUPROFEN 800 MG: 800 TABLET ORAL at 11:11

## 2021-03-23 RX ADMIN — IBUPROFEN 800 MG: 800 TABLET ORAL at 18:40

## 2021-03-23 NOTE — DISCHARGE SUMMARY
Obstetrical Discharge Summary     Name: Davni Ford MRN: [id number]  SSN: [id number]    YOB: 1985  Age: 43 y.o. Sex: female      Admit Date: 3/22/2021    Discharge Date: 3/24/2021     Admitting Physician: Jessy Deutsch MD     Attending Physician:  Margie Mcelroy DO     Admission Diagnoses: Pregnancy [Z34.90]    Discharge Diagnoses:   Information for the patient's :  Frank Carmona Gaw [[id number]]   Delivery of a 4.11 kg male infant via Vaginal, Spontaneous on 3/23/2021 at 8:50 AM  by Jessy Deutsch. Apgars were 9  and 9 . Additional Diagnoses:   Hospital Problems  Date Reviewed: 3/15/2021          Codes Class Noted POA    Pregnancy ICD-10-CM: Z34.90  ICD-9-CM: V22.2  3/23/2021 Unknown               Immunization(s):   Immunization History   Administered Date(s) Administered    Influenza Vaccine [place]) PF (>6 Mo Flulaval, Fluarix, and >3 Yrs [place], Fluzone 20921) 2021    Tdap 2021        Recent Results (from the past 12 hour(s))   GLUCOSE, POC    Collection Time: 21  6:18 AM   Result Value Ref Range    Glucose (POC) 87 65 - 100 mg/dL    Performed by [facility] Course:   Patient is a 43 y.o. [id number] s/p  at 39 weeks 1 day. Presented for Induction of labor in setting of gestational diabetes. Pregnancy complicated by GDMA2, mild polyhydramnios, GBS bacteruria, obesity, AMA, late to prenatal care (at 23 weeks), rubella nonimmune, and treated BV & yeast infection. Labor was uncomplicated. Delivered TLMI by  with 3 small labial lacerations (no repair necessary, bleeding controlled with pressure). Normal hospital course following the delivery. Fasting POC glucose on day of discharge was 87. On day of discharge patient reported minimal lochia, well controlled pain, and no other complaints. Discharged with pain regimen and bowel regimen. Advised to continue prenatal vitamins. Follow up with OB/PCP in 6 weeks.      Depression Scale  EPDS Total Score: 6    Follow up test at discharge:    - 75 g 2-hour OGTT at 6 week postpartum visit    - Offer MMR vaccine if not given at hospital prior to discharge (due to rubella non-immune status)  Condition at Discharge:  Stable  Disposition: Discharge to Home    Physical exam:  Visit Vitals  /85 (BP 1 Location: Left upper arm, BP Patient Position: At rest)   Pulse 81   Temp 97.7 °F (36.5 °C)   Resp 16   LMP 07/15/2020 (Approximate)   SpO2 99%   Breastfeeding Unknown       Exam:  Patient without distress. Resting comfortably in bed. CTAB, no w/r/r               RRR, + S1 and S2, no m/r/g               Abdomen soft, fundus firm below level of umbilicus, non tender               Lower extremities with trace edema bilaterally. No palpable cords or calf tenderness. Patient Instructions:   Current Discharge Medication List      START taking these medications    Details   docusate sodium (COLACE) 100 mg capsule Take 1 Cap by mouth two (2) times daily as needed for Constipation. Qty: 30 Cap, Refills: 0      ibuprofen (MOTRIN) 800 mg tablet Take 1 Tab by mouth every eight to twelve (8-12) hours as needed for Pain. Qty: 30 Tab, Refills: 0         CONTINUE these medications which have NOT CHANGED    Details   PNV with Ca No.65-Iron Poly-FA 60 mg iron-1 mg cap Take  by mouth. STOP taking these medications       aspirin delayed-release 81 mg tablet Comments:   Reason for Stopping:         insulin NPH (NOVOLIN N, HUMULIN N) 100 unit/mL injection Comments:   Reason for Stopping:         Insulin Needles, Disposable, 31 gauge x 5/16\" ndle Comments:   Reason for Stopping:         insulin syringe,safetyneedle (BD SafetyGlide Insulin Syringe) 0.5 mL 31 gauge x 15/64\" syrg Comments:   Reason for Stopping:               Reference my discharge instructions.   Follow-up Information     Follow up With Specialties Details Why Contact Info    Katelynn Nolen MD Family Medicine Go on 3/26/2021 A las 1:15 PM. Esta es debby daniel para chequear el peso del lg. [address] [facility]  [phone]      Rodger Metzger DO Family Medicine Go on 5/3/2021 A las 8:00 AM. Richelle Mcelroyidel es debby daniel de seguimiento postpartum a las 6 semanas. Usted necesita un test de la tolerancia a la glucosa. Por favor no comer despues de medianoche (venir en ayunas).   [address] [place] [address] 33  [phone]              Signed By:  Delmy Ferris DO    Family Medicine Resident

## 2021-03-23 NOTE — LACTATION NOTE
This note was copied from a baby's chart. Mother and baby resting. Mother states baby BF well, denies questions or needs. Discussed with mother her plan for feeding. Reviewed the benefits of exclusive breast milk feeding during the hospital stay. Informed her of the risks of using formula to supplement in the first few days of life as well as the benefits of successful breast milk feeding; referred her to the Breastfeeding booklet about this information. She acknowledges understanding of information reviewed and states that it is her plan to breastfeed and formula feed her infant. Will support her choice and offer additional information as needed. Reviewed breastfeeding basics:  How milk is made and normal  breastfeeding behaviors discussed. Supply and demand,  stomach size, early feeding cues, skin to skin bonding with comfortable positioning and baby led latch-on reviewed. How to identify signs of successful breastfeeding sessions reviewed; education on assymetrical latch, signs of effective latching vs shallow, in-effective latching, normal  feeding frequency and duration and expected infant output discussed.        Breast Assessment  Left Breast: Medium  Left Nipple: Everted, Intact  Right Breast: Medium  Right Nipple: Everted, Intact  Breast- Feeding Assessment  Attends Breast-Feeding Classes: No  Breast-Feeding Experience: Yes(4th to BF)  Breast Trauma/Surgery: No  Type/Quality: Good  Lactation Consultant Visits  Breast-Feedings: Good   Mother/Infant Observation  Mother Observation: Breast comfortable  LATCH Documentation  Latch: (did not see baby at breast)

## 2021-03-23 NOTE — PROGRESS NOTES
arrive for induction of labor for GDM    1925- Pt admitted & consented using  #[id number], pt denies any questions, no concerns    - Dr. Leyda Maria add bedside, discussed not placing baltazar bulb because pt 2.5/-3, will start pitocin & penicillin at 5am    2205- Pt gotten food from cafeteria    2330- Pt declines any needs

## 2021-03-23 NOTE — ROUTINE PROCESS
Bedside shift change report given to Isaura Mark RN (oncoming nurse) by Roby Arnold RN (offgoing nurse). Report included the following information SBAR, Kardex, Intake/Output, MAR and Recent Results.

## 2021-03-23 NOTE — PROGRESS NOTES
Labor Progress Note  Patient seen, fetal heart rate and contraction pattern evaluated, patient examined. No concerns or questions at this time. Visit Vitals  /60   Pulse 88   Temp 98.6 °F (37 °C)   Resp 16       Physical Exam:  Cervical Exam:    2-3/50/-3 vtx, soft, mid position, Smith score 6  Membranes:  Intact  Uterine Activity: Currently off monitor, previously contractions q 2-6 minutes  Fetal Heart Rate: Currently off monitor, previously 125 moderate variability, accelerations present, no decelerations, cat 1     Assessment/Plan:  Stoney Louis is a 43 y.o.  at 39w1d for induction of labor due to diabetes. IOL: 39w1d for induction of labor due to [facility]. FHR cat 1 tracing. Smith score 6. Cephalic.   - Pit to start at 5am per protocol  - Intermittent fetal & contraction monitoring, continuous monitoring when start Pit  - Nubain 10mg q3H prn, Phenergan 12.5mg q6H prn     IUP   - PNL: AB+, Ab neg, Hgb frc neg, HIV NR, RPR NR, HBsAg neg, GC/CT neg, VZV immune, Rubella nonimmune, pap NILM, GTT failed. Covid neg.   - NIPT low risk  - S/p flu, Tdap  - US at 38w0d: EFW AGA (50% at 36w scan), anterior placenta, normal anatomy except extra small post-axial digit on left hand. Cephalic. GDMA2: insulin controlled. On NPH 10U AM, 16U at night. EFW 50% at 36w scan, BPP 8/8  - S/p NPH 8U PM (50% home dose). HOLD NPH AM dose  - POC glu q4H during latent labor, q2H during active labor  - SSI  GBS Bacteruria: on initial urine culture, treated with Amoxicillin   - PCN G to start with pitocin  Mild Polyhydramnios: TIMOTHY 26.8 on 38w scan  Extra digit on baby: seen on anatomy scan. NIPT low risk.  FOB and patient's daughter have hx of polydactylyl. Maternal Obesity: EFW 50% at 36w scan. Home ASA  AMA: NIPT low risk.  Home ASA  Late to care: at 23w. initial UDS neg  Rubella nonimmune:   - Offer vaccine postpartum  Yeast Infection: Treated with vaginal clotrimazole 2% for 3 days  BV: noted on pap smear, s/p metrogel     Patient will be discussed with Dr. Leyda Maria.    Omar Davis MD  Family Medicine Resident

## 2021-03-23 NOTE — PROGRESS NOTES
1200: TRANSFER - IN REPORT:    Verbal report received from SONU Simental RN (name) on Kalani Davison  being received from Labor and Delivery (unit) for routine progression of care      Report consisted of patient’s Situation, Background, Assessment and   Recommendations(SBAR).     Information from the following report(s) SBAR, Kardex, Intake/Output, MAR and Recent Results was reviewed with the receiving nurse.    Opportunity for questions and clarification was provided.      Assessment completed upon patient’s arrival to unit and care assumed.     Patient encouraged via  to re-attempt voiding.

## 2021-03-23 NOTE — PROGRESS NOTES
Labor Progress Note  Patient seen, fetal heart rate and contraction pattern evaluated, patient examined. She denies any complaints at this time. She is feeling her contractions but only reports mild associated pain. She denies any headache, vision changes, epigastric/RUQ pain, nausea, leg pain. She declines epidural for pain management. Visit Vitals  /71   Pulse 87   Temp 98 °F (36.7 °C)   Resp 16     Physical Exam:  Heart: RRR, normal S1S2, no murmur  Lungs: Clear to auscultation bilaterally   Extremities: Trace pitting edema in bilateral lower extremities, no calf tenderness   Cervical Exam:    Deferred as pitocin was started ~1.5 hours ago. Last cervical check on admission 2-3cm/50%/-3. Membranes:  Intact  Uterine Activity: Frequency: Every 3-5 minutes  Fetal Heart Rate: Reactive  Baseline: 135 per minute  Variability: moderate  Accelerations: yes  Decelerations: none    Assessment/Plan:  Judy Paula is a 43 y.o. Sable Sicks at 39w1d for induction of labor due to gestational diabetes.     IOL: 39w1d for induction of labor due to GDMA2. FHR cat 1 tracing. Smith score 6 on admission. Cephalic.   - Pit started at 5:00 AM per protocol along with PCN G for GBS prophylaxis  - Continuous fetal & contraction monitoring  - Nubain 10mg q3H prn, Phenergan 12.5mg q6H prn   - Patient declines epidural   IUP   - PNL: AB+, Ab neg, Hgb frc neg, HIV NR, RPR NR, HBsAg neg, GC/CT neg, VZV immune, Rubella nonimmune, pap NILM, GTT failed. Covid neg.   - NIPT low risk  - S/p flu, Tdap  - US at 38w0d: EFW AGA (50% at 36w scan), anterior placenta, normal anatomy except extra small post-axial digit on left hand. Cephalic. GDMA2: insulin controlled. On NPH 10U AM, 16U at night at home. EFW 50% at 36w scan. - S/p NPH 8U PM (50% home dose).  HOLD NPH AM dose  - POC glu q4H during latent labor, q2H during active labor  - SSI  GBS Bacteruria: on initial urine culture, treated with Amoxicillin   - PCN G started with pitocin  Mild Polyhydramnios: TIMOTHY 26.8 on 38w scan  Fetal postaxial polydactyly: seen on anatomy scan. NIPT low risk.  FOB and patient's daughter have hx of polydactylyl. Maternal Obesity: EFW 50% at 36w scan. Home ASA  AMA: NIPT low risk. Home ASA  Late to care: at 23 weeks. Initial UDS neg  Rubella nonimmune:   - Offer vaccine postpartum  Yeast Infection: Treated with vaginal clotrimazole 2% for 3 days  BV: noted on pap smear, s/p metrogel     Patient will be discussed with Dr. Katie Arndt DO  Family Medicine Resident      ADDENDUM:  8:03 AM  Patient seen and examined. She reports that her water broke a few minutes ago. Cervical exam 2-3cm/70%/-2, SROM with clear/meconium stained fluid (performed by self and confirmed by RN, Josr Whitney).    Chip Duncan DO

## 2021-03-23 NOTE — PROGRESS NOTES
0700: Bedside and Verbal shift change report given to ZENAIDA Carrizales RN (oncoming nurse) by L-3 Delisa RN (offgoing nurse). Report included the following information SBAR, Kardex, Procedure Summary, Intake/Output, MAR, Accordion and Recent Results. 0750: Pt reports feeling a gush of fluid. Moderate amount clear/possibly light yellow fluid noted on chux pad.     0755: [facility] residents at bedside, SVE per MD and this RN, 2-3/-2. No BOW felt. Pt repositioned in bed.     0807: BG 69, pt given apple juice. Will continue to monitor BG in labor. 0830: This RN called to bedside per pt for complaints of pressure. SVE per RN, 10/100/+2. Dr. Rosita Piper notified. 8393: Dr. Rosita Piper at bedside, Dr. Luana Cole aware that needed for delivery. 0848: Dr. Luana Cole at bedside preparing for delivery. Pt placed in lithotomy position with legs in stirups, educated on how to push. 8605: Pt begins pushing with contractions.  pf viable infant male, see delivery summary. Delivery  ml     2 hr pospartum  ml    Total  ml     1135: Pt ambulated to bathroom with this RN, walks unassisted with steady gait. Pt states that she voided, but RN only noted ~30 cc. Pt denies sensation to void more. Pericare reviewed and performed per pt. Pt ambulates back to bed with steady gait. 1200: TRANSFER - OUT REPORT:    Verbal report given to ELVIN Rosario RN (name) on Queenie Brownr  being transferred to MIU (unit) for routine progression of care       Report consisted of patients Situation, Background, Assessment and   Recommendations(SBAR). Information from the following report(s) SBAR, Kardex, Procedure Summary, Intake/Output, MAR, Accordion, Recent Results and Med Rec Status was reviewed with the receiving nurse.     Lines:   Peripheral IV 21 Right Hand (Active)   Site Assessment Clean, dry, & intact 21 1143   Phlebitis Assessment 0 21 1143   Infiltration Assessment 0 21 1143   Dressing Status Clean, dry, & intact 03/23/21 1143   Dressing Type Transparent;Tape 03/23/21 1143   Hub Color/Line Status Pink;Flushed;Patent;Capped 03/23/21 1143        Opportunity for questions and clarification was provided.       Patient transported with:   Registered Nurse  Tech

## 2021-03-23 NOTE — L&D DELIVERY NOTE
Delivery Summary    Patient progressed to C/C/+2 and pushed for approximately 1 minute with spontaneous delivery of liveborn male infant. Head was delivered without nuchal cord. Anterior shoulder delivered with single maternal effort, and posterior shoulder followed easily. Pitocin started after delivery of the infant. Infant placed skin to skin on maternal abdomen. Delayed cord clamping x 1 min. Cord clamped x2 and cut by Dr. Ivonne Arana. Placenta delivered spontaneously intact with 3 vessel cord. Perineum and vagina inspected - 3 small labial lacerations noted; bleeding controlled with pressure. Uterine massage performed with excellent hemostasis. Mother and baby bonding in LDR.  mL. Delivery performed by Dr. Anastasia Wong (attending) and Dr. Ivonne Arana (resident). Patient: Arno Lipoma MRN: [id number]  SSN: [id number]    YOB: 1985  Age: 43 y.o. Sex: female       Information for the patient's :  Bonnie Spear, Male [facility] [[id number]]       Labor Events:    Labor: No    Steroids: None   Cervical Ripening Date/Time:       Cervical Ripening Type: None   Antibiotics During Labor: Yes   Rupture Identifier:      Rupture Date/Time: 3/23/2021 7:50 AM   Rupture Type: SROM   Amniotic Fluid Volume:  Moderate    Amniotic Fluid Description: Meconium    Amniotic Fluid Odor: None    Induction: Oxytocin       Induction Date/Time: 3/23/2021 5:05 AM    Indications for Induction: Diabetes    Augmentation: None   Augmentation Date/Time:      Indications for Augmentation:     Labor complications: None       Additional complications:        Delivery Events:  Indications For Episiotomy:     Episiotomy: None   Perineal Laceration(s): None   Repaired:     Periurethral Laceration Location:      Repaired:     Labial Laceration Location: bilateral   Repaired: No   Sulcal Laceration Location:     Repaired:     Vaginal Laceration Location:     Repaired:     Cervical Laceration Location:     Repaired:     Repair Suture: None   Number of Repair Packets:     Estimated Blood Loss (ml):  ml   Quantitative Blood Loss (ml)                Delivery Date: 3/23/2021    Delivery Time: 8:50 AM  Delivery Type: Vaginal, Spontaneous  Sex:  Male    Gestational Age: 36w3d   Delivery Clinician:  Jony Bright  Living Status: Living   Delivery Location: L&D room 209           APGARS  One minute Five minutes Ten minutes   Skin color: 1   1        Heart rate: 2   2        Grimace: 2   2        Muscle tone: 2   2        Breathin   2        Totals: 9   9            Presentation: Vertex    Position:        Resuscitation Method:  Suctioning-bulb; Tactile Stimulation     Meconium Stained: None      Cord Information: 3 Vessels  Complications: None  Cord around:    Delayed cord clamping? Yes  Cord clamped date/time:3/23/2021  8:51 AM  Disposition of Cord Blood: Discard    Blood Gases Sent?: No    Placenta:  Date/Time: 3/23/2021  8:57 AM  Removal: Expressed      Appearance: Normal      Measurements:  Birth Weight:        Birth Length:        Head Circumference:        Chest Circumference:       Abdominal Girth: Other Providers:   DearESSENCE Álvarez;SEBASTIEN PHILLIPS;LORNE BURNETTE;JOESPH MOROCHO;KAREN FLOYD;UBALDO GARCIA;ROGER RGEEN, Obstetrician;Primary Nurse;Primary [place] Nurse;Resident; Resident; Charge Nurse;Resident           Group B Strep: No results found for: Rheba Reus  Information for the patient's :  Neftali Mora [[id number]]   No results found for: ABORH, PCTABR, PCTDIG, BILI, ABORHEXT, ABORH     No results for input(s): PCO2CB, PO2CB, HCO3I, SO2I, IBD, PTEMPI, SPECTI, PHICB, ISITE, IDEV, IALLEN in the last 72 hours.

## 2021-03-24 VITALS
RESPIRATION RATE: 16 BRPM | SYSTOLIC BLOOD PRESSURE: 136 MMHG | OXYGEN SATURATION: 99 % | DIASTOLIC BLOOD PRESSURE: 85 MMHG | HEART RATE: 81 BPM | TEMPERATURE: 97.7 F

## 2021-03-24 LAB
GLUCOSE BLD STRIP.AUTO-MCNC: 87 MG/DL (ref 65–100)
SERVICE CMNT-IMP: NORMAL

## 2021-03-24 PROCEDURE — 82962 GLUCOSE BLOOD TEST: CPT

## 2021-03-24 NOTE — PROGRESS NOTES
Pt off unit in stable condition via wheelchair with volunteers for discharge home per Dr. Rivera . Pt is aware to follow up in 6 weeks (appt scheduled for 5/3/2021 at 0800 with Dr. Johanna Molina at [facility]). Prescriptions electronically sent to pt's pharmacy by MD.  Pt denies any HA, dizziness, N/V, or pain at this time. Infant in car seat and discharged with mother. Postpartum discharge teaching completed by this RN using  #[id number]. Perineal supplies given to pt. Discharge papers signed by pt and RN.

## 2021-03-24 NOTE — PROGRESS NOTES
3/24/2021  2:00 PM    CM met with PETE to complete initial assessment and begin discharge planning. MOB verified and confirmed demographics. PETE lives with her children ages: 12,11, and 5yr at the address on file. MOB noted that CHEYANNE- Cesar Durham ([phone]), stays with her at times. PETE is not employed and plans to be home with baby. FOB is supportive. PETE reports she has good family support. PETE plans to breast and bottle feed baby. MOB plans to follow with [facility] for [facility] care. PETE has car seat, bassinet/crib, clothing, bottles and all necessary supplies for baby. PETE does not have insurance and would like to apply for Medicaid for herself and baby. [employer] notified to follow up with PETE.  PETE is also interested in applying for [facility] services. CM provided MOB with contact information for [facility] clinic. Care Management Interventions  PCP Verified by CM: Yes([facility])  Mode of Transport at Discharge:  Other (see comment)  Transition of Care Consult (CM Consult): Discharge Planning  Current Support Network: Own Home  Confirm Follow Up Transport: Family  Discharge Location  Discharge Placement: Home with family assistance  Bertie Hatchet

## 2021-03-24 NOTE — DISCHARGE INSTRUCTIONS
Patient Discharge Instructions    Bethany Vallejo / [id number] : 1985    Admitted 3/22/2021 Discharged: 3/24/2021        Por favor tenga moshe documento presente en lara daniel de seguimiento con lara médico primario. Primary care provider:  Rolando Rollins DO    Discharging provider:  Khalida Naranjo DO - Family Medicine Resident  Dada Lepe MD -  OBGYN attending        [address]:  Pregnancy [R08.64]      RECOMENDACIONES DE CUIDADO:     Follow-up Information     Follow up With Specialties Details Why Contact Info    Juana Duncan MD Family Medicine Go on 3/26/2021 A las 1:15 PM. Luciana Furlong es debby daniel para chequear el peso del lg. [address] [facility]  [phone]      Tia Delacruz DO Family Medicine Go on 5/3/2021 A las 8:00 AM. Luciana Furlong es debby daniel de seguimiento postpartum a las 6 semanas. Usted necesita un test de la tolerancia a la glucosa. Por favor no comer despues de medianoche (venir en ayunas). [address]  [phone]            Continue Tratamiento:  - Por favor continue Motrin 800 mg, 1 tableta cada 8 horas por los próximos 2 días, luego 1 tableta cada 8 horas mientras sea necesario para el dolor.  - Por favor continue Colace 100 mg para el estreñimiento, tome 1 tableta dos veces al día hasta que pueda defercar regularmente sin necesidad del medicamento. - Por favor continue tomando madiha vitaminas prenatales. Pruebas de seguimiento que necesita:   - Necesita un test de la tolerancia a la glucosa (75 g 2-hour OGTT)    Resultados pendientes:   En el momento de lara de tigre los resultados de las siguientes pruebas están pendiente:  [place]. Por favor discuta estos resultados con lara proveedor primario en lara daniel de seguimiento. Importante que [employer] siguientes síntomas: dolor de [place], falta de [place], [place], escalofríos, náusea, vómito, diarrea, cambios en lara estado mental, caídas, debilidad y sangrado. DIETA/que comer:  Regular    ACTIVIDAD FÍSICA:   Instrucciones de [place] F[place]ica    No levante nada que sea más pesado que lara bebé por las próximas 6 semanas. No insertar nada por la vaginal por 6 semanas. Luego del parto por cesárea/ vaginal debe evitar tener relaciones sexuales por 6 semanas. Tendrá lara daniel de seguimiento posparto a las 6 semanas. Puede manejar lara vehículo mientras no esté tomando Percocet ni ningún medicamento nárcotico (Motrin está fidelina). Cuidado de Herida:  llene el emerson bottle con agua tibia y exprima hasta que salga un chorro de agua por la boquilla para ayudarle a limpiar el área perineal.      Entiendo que si surge algún problema cuando llegue a mi hogar puedo contactar a mi médico.    Me brady explicado las siguientes instrucciones y brady aclarado mis dudas. Entiendo y reconozco las instrucciones brindadas. Firma de Tressa Sour / [place] de paciente ó representante                                                         Fecha/Hora    Patient Education        El período posparto: Instrucciones de cuidado-Instrucciones de cuidado  Postpartum: Care Instructions  Instrucciones de cuidado  Después del parto (período posparto), lara cuerpo pasa por muchos cambios. Algunos de estos cambios suceden stephie varias semanas. [address], el cuerpo comienza a recuperarse y se prepara para el amamantamiento. Es posible que [address] se sienta sensible. Las hormonas pueden cambiar lara estado de ánimo sin advertencia y sin motivo aparente.   [address], [place] mujeres tienen emociones que [place] de taylor a la nena. Es posible que le resulte difícil dormir. Es posible que llore mucho. [place] se llama \"melancolía de la maternidad\". Estas emociones abrumadoras suelen desaparecer dentro de unos días o semanas. Amna es importante hablar con lara médico acerca de madiha sentimientos. Stephie las primeras semanas después del [place], es fácil cansarse demasiado o sentirse [place]. No marlene demasiados esfuerzos. Descanse cada vez que pueda, acepte la ayuda de los demás, coma fidelina y ju abundantes líquidos. En el primer par de [employer] de yared a lizett, es posible que lara médico o partera deseen verla y hacer un plan para cualquier atención de seguimiento que usted pueda necesitar. Probablemente tendrá [place] daniel completa de posparto dentro de los primeros 3 meses después del [place]. En nito momento, lara médico o partera revisarán lara recuperación del parto. Él o naomi también verán cómo está enfrentando usted madiha emociones y conversarán sobre madiha inquietudes y preguntas. La atención de seguimiento es debby parte clave de lara tratamiento y seguridad. Asegúrese de hacer y acudir a todas las citas, y llame a lara médico si está teniendo problemas. También es debby buena idea saber los resultados de madiha exámenes y mantener debby lista de los medicamentos que ady. ¿Cómo puede cuidarse en el hogar? · [place] o descanse cuando lara bebé lo marlene. · Si es posible, permita que madiha familiares o madiha amigos la ayuden con las tareas del hogar. No intente hacerlo todo usted diamond. · Si tiene hemorroides o hinchazón o dolor alrededor de la abertura de la vagina, pruebe aplicarse frío y calor. Puede aplicarse hielo o debby compresa fría en la sindi stephie 10 a 20 minutos cada vez. Póngase un paño price entre el hielo y la piel. Además, trate de sentarse en algunos centímetros de agua tibia (baño de asiento) 3 veces al día y después de las evacuaciones.   · [employer] analgésicos (medicamentos para el dolor) exactamente según las indicaciones. ? Si el médico le recetó un analgésico, tómelo según las indicaciones. ? Si no está tomando un analgésico recetado, pregúntele a lara médico si puede tree eladio de [employer]. · Coma más fibra para evitar el estreñimiento. Incluya alimentos bladimir panes y cereales integrales, verduras crudas, frutas crudas y secas, y frijoles (habichuelas). · Ny abundantes líquidos, los suficientes bladimir para que lara orina sea de color amarillo adelina o transparente bladimir el agua. Si tiene [employer], del corazón o del hígado y tiene que Blanquita's líquidos, hable con lara médico antes de aumentar lara consumo. · No se lave el interior de la vagina con líquidos (lavados vaginales). · Si tiene puntos de sutura, mantenga la sindi limpia con agua tibia, ya sea echándola o rociándola sobre la sindi externa de la vagina y el ano después de usar el baño. · Amilcar debby lista de preguntas para hacerle a lara médico o partera. Shasha preguntas podrían ser sobre lo siguiente:  ? [employer] senos, bladimir bultos o sensibilidad. ? Cuándo esperar que regrese lara período menstrual.  ? Qué forma de anticoncepción es la más adecuada para usted. ? El peso que aumentó stephie el [facility]. ? Las opciones de [address]. ? Namita Jodi y bebidas son mejores para usted, sobre todo si está amamantando. ? Problemas que podría tener con la lactancia. ? Cuándo puede [employer]. Algunas mujeres oswald vez quieran hablar sobre lubricantes vaginales. ? Cualquier sentimiento de tristeza o inquietud que tenga. ¿Cuándo debe pedir ayuda? Llame al 911 en cualquier momento que considere que necesita atención de [place]. Por ejemplo, llame si:    · Tiene pensamientos de lastimarse o de hacerle daño a lara bebé o a otra persona.     · Se desmayó (perdió el conocimiento).     · Tiene dolor en el pecho, le falta el aire o tose maria dolores.     · Tiene convulsiones.    Llame a lara médico ahora mismo o busque atención médica de inmediato si:    · Lara sangrado vaginal parece hacerse más abundante.     · Se siente mareada o aturdida, o que está a punto de desmayarse.     · Tiene fiebre.     · Tiene dolor abdominal nuevo o más intenso.     · Tiene síntomas de un coágulo de maria dolores en la pierna (que se llama trombosis venosa profunda), bladimir:  ? Dolor en la pantorrilla, el muslo, la chelsie o detrás de la rodilla. ? Enrojecimiento e hinchazón en la pierna o la chelsie.     · Tiene señales de preeclampsia, bladimir:  ? Hinchazón repentina de la dickson, las kris o los pies. ? Nuevos problemas de la vista (bladimir oscurecimiento, bartolo borroso o bartolo puntos). ? Dolor de jasmin intenso. Preste especial atención a los cambios en lara kiran y asegúrese de comunicarse con lara médico si:    · Tiene nuevo flujo vaginal o moshe empeora.     · Se siente la nena o deprimida.     · Tiene problemas con los senos o el amamantamiento. ¿Dónde puede encontrar más información en inglés? Vaya a [email or web address]  Leonila [id number] en la búsqueda para aprender más acerca de \"El período posparto: Instrucciones de cuidado-Instrucciones de cuidado. \"  Revisado: 11 febrero, 2020               Versión del contenido: 12.6  © 4672-8762 Healthwise, Incorporated. Las instrucciones de cuidado fueron adaptadas bajo licencia por Good Help Connections (which disclaims liability or warranty for this information). Si usted tiene [place] afección médica o sobre estas instrucciones, siempre pregunte a lara profesional de kiran. Healthwise, Incorporated niega toda garantía o responsabilidad por lara uso de esta información. Patient Education        Lactancia: Maria G Lam  Breastfeeding: Care Instructions  Instrucciones de cuidado     Amamantar tiene muchos beneficios. Puede disminuir las probabilidades de que lara bebé contraiga debby infección.  También puede hacer que sea menos probable que lara bebé tenga problemas bladimir diabetes y obesidad en un futuro. Amamantar también la ayuda a establecer madan afectivos con lara bebé. Stephie los primeros días después del parto, shasha senos producen un líquido espeso y amarillento llamado calostro. [place] al bebé nutrientes y anticuerpos contra las infecciones. Eso es todo lo que los bebés necesitan stephie los primeros días después del nacimiento. Shasha senos se llenarán de [address] unos aarti después del [place]. Amamantar es debby habilidad que mejora con la práctica. Sea paciente consigo misma y con lara bebé. Si tiene problemas, puede obtener [place] y seguir amamantando. La atención de seguimiento es debby parte clave de lara tratamiento y seguridad. Asegúrese de hacer y acudir a todas las citas, y llame a lara médico si está teniendo problemas. También es debby buena idea saber los resultados de shasha exámenes y mantener debby lista de los medicamentos que ady. ¿Cómo puede cuidarse en el hogar? · Amamante a lara bebé toda vez que tenga hambre. Las primeras 2 semanas, lara bebé tomará el pecho al menos 8 veces en un período de 24 horas. [place] le permitirá mantener lara Lizbet Buff. Las señales de que lara bebé tiene hambre incluyen:  ? Succionarse las [place]. ? Lamerse los labios. ? Girar la jasmin hacia lara pecho. · Ponga debby almohada o debby almohada de lactancia en lara regazo para apoyar los brazos y a lara bebé. · Sostenga a lara bebé en debby posición cómoda. ? Puede sostener a lara bebé de diversas formas. Debby de las posiciones más comunes es la de [place]. Con un brazo sostiene a lara bebé, con la jasmin del bebé apoyada en la curva del codo. Con la mano abierta le sostiene el trasero o la espalda a lara bebé. El abdomen de lara bebé reposa contra el suyo. ? Si tuvo a lara bebé por cesárea, trate de sostenerlo en la posición de fútbol americano. Esta posición mantiene a lara bebé alejado de lara estómago. Ponga a lara bebé debajo del brazo, con el cuerpo del lado que lo Jeananne Ata a amamantar.  Sostenga la parte superior del cuerpo de lara bebé con el Genevia Horsham. Con jeana mano usted puede controlar la jasmin de lara bebé para atraerle la boca a lara seno. ? Pruebe diferentes posiciones con cada sesión de alimentación. Si está teniendo [place], pídale ayuda a lara médico o a un consultor de lactancia. · Para lograr que lara bebé se prenda al pezón:  ? Teetee Golas y apriétese el seno con debby mano en forma de \"U\", con el pulgar del lado externo del seno y los dedos del lado interno. También puede sostenerse el seno con la mano en forma de \"C\", con todos los dedos debajo del pezón y el pulgar arriba. Pruebe las diferentes posiciones para conseguir la prendida más profunda para cualquier posición de [employer] use. Lara otro brazo está detrás de la espalda de lara bebé, con la mano sosteniendo la base de la jasmin del bebé. Coloque los dedos y el pulgar apuntando a las orejas del bebé. ? Puede tocar el labio inferior del bebé con lara pezón para hacer que lara bebé dayami lara boca. Espere hasta que lara bebé dayami fidelina la boca, bladimir un bostezo brodie. Luego, asegúrese de atraer a lara bebé rápidamente a lara seno, en vez de lara seno al bebé. A medida que acerca a lara bebé al seno, use la otra mano para sostener el seno y guiarlo dentro de la boca del bebé. ? Tanto el pezón bladimir debby gran parte de la sindi más oscura alrededor del pezón (areola) deben estar en la boca del bebé. Los labios del bebé deben estar abiertos hacia afuera, no doblados hacia adentro (invertidos). ? Verifique que haya un patrón regular al succionar y tragar mientras el bebé se está alimentando. Si no puede bartolo ni escuchar debby deglución elsa, observe las orejas del bebé, que se moverán levemente cuando el bebé traga. Si la nariz de lara bebé parece estar bloqueada por lara seno, lleve más a lara bebé contra lara cuerpo. [place] ayudará a inclinar la jasmin del bebé ligeramente hacia atrás, de modo que solo el borde de debby fosa nasal esté leyla para respirar.   ? Cuando lara bebé está prendido, generalmente puede sacar lara mano de abajo de lara seno y colocarla debajo de lara bebé para acunarlo. Ahora relájese y amamante a lara bebé. · Usted sabrá que lara bebé se está alimentando fidelina cuando:  ? Lara boca cubre debby buena parte de la areola y los labios están curvados hacia afuera. ? Rosario Castellanos y lara nariz descansan sobre lara pecho. ? La succión es profunda y rítmica, con pausas cortas. ? Puede bartolo y oír cómo traga lara bebé. ? No siente dolor en el pezón. · Disha Pounds senos a lara bebé en cada sesión de alimentación. Cada vez que [place], cambie el seno con el que comienza. · Cada vez que necesite retirar al bebé de lara seno, póngale un dedo en la comisura de los labios. Empuje el dedo suavemente entre las encías de lara bebé para romper el sello. Si no rompe el sello hermético antes de retirar a lara bebé, madiha pezones pueden ponerse doloridos, agrietados o amoratados. · Después de alimentar a lara bebé, reagan unas palmaditas suaves en la espalda para que pueda eliminar el aire que haya tragado. Después de que el bebé eructe, vuelva a ofrecerle el mismo seno o el otro. Algunas veces el bebé querrá seguir comiendo después de eructar. ¿Cuándo debe pedir ayuda? Llame a lara médico ahora mismo o busque atención médica inmediata si:    · Tiene síntomas de debby infección en el seno, tales bladimir:  ? Mayor dolor, hinchazón, enrojecimiento o temperatura alrededor del seno. ? Vetas rojizas que se extienden del seno. ? Pus que supura del seno. ? Cody Mckeon.     · Lara bebé no ha mojado pañales stephie 6 horas. Preste especial atención a los cambios en lara kiran y asegúrese de comunicarse con lara médico si:    · Lara bebé tiene dificultades para prenderse al seno.     · Usted continúa sintiendo dolor o incomodidad al amamantar.     · Tiene otras preguntas o inquietudes. ¿Dónde puede encontrar más información en inglés?   Vaya a [email or web address]  Zev So P492 en la búsqueda para aprender más acerca de \"Lactancia: Instrucciones de cuidado. \"  Revisado: 11 febrero, 2020               Versión del contenido: 12.6  © 4082-2559 Healthwise, Incorporated. Las instrucciones de cuidado fueron adaptadas bajo licencia por Good Help Connections (which disclaims liability or warranty for this information). Si usted tiene [place] afección médica o sobre estas instrucciones, siempre pregunte a lara profesional de kiran. Healthwise, Incorporated niega toda garantía o responsabilidad por lara uso de esta información. Patient Education        Alex Humphrey métodos anticonceptivos después del parto  Learning About Birth Control After Childbirth  ¿Qué es un método anticonceptivo? Un método anticonceptivo es cualquier método usado para prevenir el embarazo. Francisco Brew de decir método anticonceptivo es anticoncepción. Espere hasta michelle sanado (aproximadamente de 4 a 6 semanas) antes de tener relaciones sexuales. Si tiene [employer] sin un método anticonceptivo, existe la posibilidad de Nikkie Carolina. [place] es cierto incluso si todavía no ha vuelto a tener madiha períodos. Aun si amamanta, usted todavía puede Nikkie Crewe. La mayoría de los especialistas sugieren esperar al menos 18 meses para volver a quedar embarazada para reducir los riesgos para usted y el bebé. Usted puede tener motivos para quedar [employer], de modo que hable con lara médico acerca de los riesgos y los beneficios. La única manera samaniego de impedir otro embarazo es no tener relaciones sexuales. Amna encontrar un buen método anticonceptivo con el que usted se sienta cómoda puede ayudarle a evitar un embarazo no planeado. Lara médico puede ayudarle a elegir el método anticonceptivo que sea adecuado para usted. ¿Cuáles son los tipos de métodos anticonceptivos? · Los anticonceptivos reversibles de larga duración (LARC, por madiha siglas en inglés) son los métodos reversibles más eficaces que puede usar para prevenir el embarazo.  Si usted decide que desea Aldino Sánchez, tienen que extraérselos. Los LARC son implantes y dispositivos intrauterinos (DIU). Mientras están colocados, suelen prevenir el embarazo por años. ? Los implantes se colocan debajo de la piel del brazo. [place] puede hacerse inmediatamente después de yared a lizett. Liberan la hormona progestina y previenen el embarazo stephie aproximadamente 3 años. ? Un médico coloca los DIU en el [place]. [place]uin puede hacerse yahir después de yared a lizett, si usted y lara médico hablan sobre ello de [place]AV[place]. O puede hacerse en debby visita médica más adelante. Hay dos tipos principales de DIU: el DIU de cobre y el DIU hormonal. El DIU hormonal libera progestina. Los DIU previenen el embarazo por 3 a 10 años, dependiendo del tipo. · Los métodos hormonales son muy buenos para prevenir el Catherine Heckler. Las píldoras anticonceptivas combinadas (\"la píldora\"), los parches dérmicos y los anillos vaginales [address] hormonas estrógeno y progestina. Depo-Provera es debby inyección que se aplica cada 3 meses. Las inyecciones, las minipíldoras, los DIU y los implantes liberan solo progestina. Es mejor usar opciones de progestina solamente las primeras semanas después de yared a lizett. · Los métodos de bowen no previenen el embarazo michelle fidelina bladimir lo [employer], los DIU o los métodos hormonales. Los métodos de bowen [place] [employer] condones, los diafragmas y los capuchones cervicales. Debe [employer] métodos de bowen cada vez que tiene relaciones sexuales. Si usted tenía un diafragma o un capuchón cervical antes de quedar embarazada, hable con lara médico para bartolo si necesita un tamaño diferente. Los condones pueden usarse en cualquier momento después de yared a lizett. · La planificación familiar natural se conoce también bladimir método del calendario de fertilidad o método del ritmo. Puede funcionar si usted y lara roxanna tienen Loanne Gottron y si usted tiene un ciclo regular de ovulación.  Amna no funciona mejor que otros métodos anticonceptivos. Usted tendrá que llevar buenos registros para saber cuándo hay mayores probabilidades de [place]. Y stephie esos días, tendrá que usar un método de bowen o no tener relaciones sexuales. · Los métodos anticonceptivos permanentes (esterilización) le proporcionan debby protección duradera contra el embarazo. Un hombre se puede realizar debby vasectomía. Debby gatito puede ligarse las trompas (ligadura de trompas). Amna esto es solo debby buena opción si está samaniego de que no quiere tener más hijos. · Los anticonceptivos de [place] son métodos de respaldo para prevenir el embarazo si no usó un método anticonceptivo o si un condón se rompe. El método anticonceptivo de urgencia más eficaz lo receta un médico. [place] puede ser un DIU de cobre (colocado por un médico) o debby pastilla de venta bajo receta. Usted también puede adquirir pastillas anticonceptivas de urgencia sin Sydell Hand en la mayoría de las [facility]. ¿Cómo puede obtener un método anticonceptivo? · Puede comprar:  ? Condones y espermicidas sin receta en farmacias, en línea y en muchas tiendas de comestibles. ? Algunos anticonceptivos de [place] de venta leyla en la mayoría de las [facility]. · Usted necesita bartolo a un médico o ir a debby clínica de planificación familiar para:  ? Samara Fagan receta para píldoras anticonceptivas y otros métodos que usan hormonas. ? Hacerse colocar un implante o un DIU, incluido el tipo de DIU usado para la anticoncepción de [place]. ? Que le [employer] inyección hormonal.  ? Samara Fagan receta para un diafragma o un capuchón cervical.  ? Obtener debby receta para determinados tipos de anticonceptivos de [place]. La atención de seguimiento es debby parte clave de lara tratamiento y seguridad. Asegúrese de hacer y acudir a todas las citas, y llame a lara médico si está teniendo problemas. También es debby buena idea saber los resultados de madiha exámenes y mantener debby lista de los medicamentos que ady.   ¿Dónde puede encontrar más información en inglés? Kamilla Sanchez a [email or web address]  Alfonso Hobson T758 en la búsqueda para aprender más acerca de \"Aprenda sobre métodos anticonceptivos después del [place]. \"  Revisado: 11 febrero, 2020               Versión del contenido: 12.6  © 9121-6937 Healthwise, Incorporated. Las instrucciones de cuidado fueron adaptadas bajo licencia por Good [employer]Yst Connections (which disclaims liability or warranty for this information). Si usted tiene [place] afección médica o sobre estas instrucciones, siempre pregunte a lara profesional de kiran. Healthwise, Incorporated niega toda garantía o responsabilidad por lara uso de esta información.

## 2021-03-24 NOTE — LACTATION NOTE
This note was copied from a baby's chart. Mom giving fomrula now. States her milk is not in yet. States bby gets fustrated at her breast.  Discussed importance of colostrum and supply and demand. Breast Feeding Discharge Information discussed:    Chart shows numerous feedings, void, stool WNL. Discussed Importance of monitoring outputs and feedings on first week of  Breastfeeding. Discussed ways to tell if baby getting enough, ie  Voids and stools, by day 7, baby should have at least  4-6 wet diapers a day, change in color of stool to a seedy yellow, and return to birth wt within 2 weeks with a steady increase after that. .  Follow up with pediatrician visit for weight check in 1-2 days reviewed. Discussed Breast feeding support groups and encouraged to call Warm line number, [phone]  for any breast feeding questions or problems that arise. Please leave a message and tell us what is going on. We will return your call within 24 hours. Please repeat your phone number. Feedings  Encouraged mom to attempt feeding with baby led feeding cues. Just as sucking on fingers, rooting, mouthing. Looking for 8-12 feedings in 24 hours. Don't limit baby at breast, allow baby to come off breast on it's own. Baby may want to feed  often and may increase number of feedings on second day of life. Skin to skin encouraged. In 4-6 weeks, baby may go though a growth spurt and increase feedings for several days to increase your milk supply. If baby doesn't nurse,  Mom should Pump or hand express drops, 12-18 drops, and give infant any expressed milk. If not pumping any milk, mom should contact pediatrician for possible need for supplementation. MOM's DIET    Discussed eating a healthy diet. Instructed mother to eat a variety of foods in order to get a well balanced diet.  She should consume an extra 300-500 calories per day (more than her non-pregnant requirement.) These extra calories will help provide energy needed for optimal breast milk production. Mother also encouraged to \"drink to thirst\" and it is recommended that she drink fluids such as water and fruit/vegetable juice. Nutritious snacks should be available so that she can eat throughout the day to help satisfy her hunger and maintain a good milk supply. Continue taking your Prenatal vitamins as long as you breast feed. Engorgement Care Guidelines:  Anticipatory guidance shared. If breast become engorged, to help decrease engorgement. Frequent breastfeeding encouraged, cool packs around breast after nursing may help. May take motrin or Ibuprofen as ordered by your Doctor. Call your doctor, midwife and/or lactation consultant if:   Alexandre Marquez is having no wet or dirty diapers    Baby has dark colored urine after day 3  (should be pale yellow to clear)    Baby has dark colored stools after day 4  (should be mustard yellow, with no meconium)    Baby has fewer wet/soiled diapers or nurses less   frequently than the goals listed here    Mom has symptoms of mastitis   (sore breast with fever, chills, flu-like aching)          Information discussed in [address] [place] [address] And placed on discharge sheets in [address] [place] [address].

## 2021-05-03 ENCOUNTER — ROUTINE PRENATAL (OUTPATIENT)
Dept: FAMILY MEDICINE CLINIC | Age: 36
End: 2021-05-03
Payer: SUBSIDIZED

## 2021-05-03 VITALS
WEIGHT: 208 LBS | BODY MASS INDEX: 40.84 KG/M2 | HEIGHT: 60 IN | DIASTOLIC BLOOD PRESSURE: 69 MMHG | OXYGEN SATURATION: 98 % | SYSTOLIC BLOOD PRESSURE: 102 MMHG | HEART RATE: 82 BPM | RESPIRATION RATE: 18 BRPM | TEMPERATURE: 97.1 F

## 2021-05-03 DIAGNOSIS — Z86.32 HISTORY OF GESTATIONAL DIABETES: ICD-10-CM

## 2021-05-03 PROCEDURE — 0502F SUBSEQUENT PRENATAL CARE: CPT | Performed by: STUDENT IN AN ORGANIZED HEALTH CARE EDUCATION/TRAINING PROGRAM

## 2021-05-03 NOTE — PROGRESS NOTES
Chief Complaint   Patient presents with   [address]     1. Have you been to the ER, urgent care clinic since your last visit? Hospitalized since your last visit? No    2. Have you seen or consulted any other health care providers outside of the [address] [place] [address] since your last visit? Include any pap smears or colon screening. No     Reviewed record in preparation for visit and have obtained necessary documentation.

## 2021-05-03 NOTE — PATIENT INSTRUCTIONS
Parto vaginal: Instrucciones de cuidado Vaginal Childbirth: Care Instructions Instrucciones de cuidado Lara cuerpo sanará poco a poco stephie las [employer]. Stephie las primeras semanas después del nacimiento de lara bebé, es fácil cansarse mucho o sentirse [place]. Los [employer] hormonas pueden afectar lara estado de ánimo sin previo aviso. Podría descubrir que es difícil satisfacer las exigencias adicionales de energía y [place]. No marlene demasiados esfuerzos. La atención de seguimiento es debby parte clave de tu tratamiento y seguridad. Asegúrate de hacer y acudir a todas las citas, y llama a tu médico si estás teniendo problemas. También es debby buena idea saber los resultados de los exámenes y mantener debby lista de los medicamentos que garret. Cómo puede cuidarse en el hogar? Sangrado vaginal y cólicos · Después del parto, tendrá debby secreción sanguinolenta de la vagina. Esta se volverá rosada al cabo de debby semana y luego luanne o amarillenta después de aproximadamente 10 días. Podría durar entre 2 y 3 semanas o más tiempo, hasta que el útero haya sanado. Use toallas sanitarias hasta que deje de sangrar. Usar toallas sanitarias le ayuda a observar el sangrado. · No se preocupe si le salen algunos coágulos de [place], siempre y cuando tien de agapito tamaño que debby pelota de golf. Si tiene un desgarro o puntos de sutura en la sindi vaginal, cámbiese la toalla sanitaria al menos cada 4 horas. Solis le ayudará a prevenir dolor e infección. · Vikas vez tenga cólicos los primeros días después del [place]. Estos son normales y ocurren a medida que el [place] se reduce a lara tamaño normal. [place] un analgésico (medicamento para el dolor) de venta leyla, bladimir acetaminofén (Tylenol), ibuprofeno (Advil, Motrin) o naproxeno (Aleve), para los cólicos. Mary y siga todas las indicaciones de la [place]. No tome aspirina, porque puede causar [employer].  
· No tome dos o más analgésicos al mismo tiempo a menos que el médico se lo haya indicado. Muchos analgésicos contienen acetaminofén, que es Tylenol. El exceso de acetaminofén (Tylenol) puede ser dañino. Puntos de sutura · Si tiene puntos de sutura, se disolverán solos y no es necesario retirarlos. Siga las indicaciones de lara médico para higienizar la sindi con suturas. · Aplíquese hielo o debby compresa fría en la sindi dolorida por entre 10 y 20 minutos a la vez, varias veces al día, stephie los primeros días. Póngase un paño price entre el hielo y la piel. · Siéntese en unas pocas pulgadas de agua tibia (baño de asiento) 3 veces al día y [address] evacuaciones. El agua tibia ayuda con el dolor y la comezón. Si no tiene [place], debby ducha tibia puede ayudar. Llenura de los senos · Los senos (mamas) se le podrían llenar en exceso (congestionar) stephie los primeros días después del [place]. Para ayudar a que la [employer] y a aliviar el dolor, caliéntese los senos en la ducha o usando toallas tibias y húmedas antes de amamantar. · Si no amamanta, no se aplique calor en los senos ni se los toque. Use un sostén que le quede fidelina y aplíquese hielo hasta que la sensación de llenura desaparezca. [place] suele durar de 2 a 3 días. · Aplíquese hielo o debby compresa fría en los senos después de amamantar para reducir la hinchazón y el dolor. Póngase un paño price entre el hielo y la piel. Actividad · Aliméntese en forma equilibrada. No intente bajar de peso reduciendo [employer]. Continúe tomando madiha vitaminas prenatales o tome un multivitamínico. 
· Descanse todo lo que pueda. Trate de dormir siestas cuando lara bebé duerme stephie el día. · Marlene algo de ejercicio todos los días. Amna no marlene ejercicio intenso sino hasta que lara médico lo apruebe. · Espere a que haya sanado (alrededor de 4 a 6 semanas) antes de [employer]. Lara médico le dirá cuándo está fidelina tener relaciones sexuales.  
· Si no desea quedar embarazada, hable con lara médico acerca de métodos anticonceptivos. Usted puede quedar embarazada aun antes de que vuelva lara período. Además, puede quedar [employer]. Poppy mental 
· Es normal sentir algo de tristeza, ansiedad, insomnio y [address] en el estado de ánimo después de regresar al hogar. Si se siente malhumorada o desesperanzada stephie más de unos pocos días, o tiene problemas para hacer las cosas que necesita hacer, hable con lara [place] y hemorroides · Ny mucho líquido. Si tiene debby enfermedad renal, cardíaca o hepática y tiene que restringir los líquidos, hable con lara médico antes de aumentar la cantidad de líquido que lg. · Coma abundante cantidad de [employer]. Coma un panecillo (\"muffin\") de salvado o cereal de salvado en el desayuno. Trate de comer debby fruta bladimir refrigerio a media tarde. · Para las hemorroides dolorosas y con comezón, aplíquese hielo o debby compresa fría en la sindi varias veces al día por 10 minutos a la vez. A continuación, póngase debby compresa tibia en la sindi por entre 10 y 21 minutos o marlene un baño de asiento tibio. Cuándo debe pedir ayuda? Llame al 911 en cualquier momento que considere que necesita atención de [place]. Por ejemplo, llame si: 
  · Tiene pensamientos de herirse a sí misma, hacerle daño a lara bebé o a otra persona.  
  · Se desmayó (perdió el conocimiento).  
  · Tiene dolor en el pecho, le falta el aire o tose maria dolores.  
  · Tiene convulsiones. Llame a lara médico ahora mismo o busque atención médica inmediata si: 
  · Tiene sangrado vaginal intenso.  
  · Está mareada o aturdida, o siente bladimir si estuviera por desmayarse.  
  · Tiene fiebre.  
  · Tiene un dolor nuevo o peor en el abdomen o la pelvis.  
  · Tiene síntomas de un coágulo de maria dolores en la pierna (que se llama trombosis venosa profunda), bladimir: ? Dolor en la pantorrilla, el muslo, la chelsie o detrás de la rodilla. ?  Enrojecimiento e hinchazón en la pierna o la chelsie.  
  · [place] señales de preeclampsia, bladimir: 
? Hinchazón repentina de la dickson, las kris o los pies. ? Nuevos problemas de visión (bladimir oscurecimiento, bartolo borroso o bartolo puntos). ? Dolor de jasmin intenso. Preste especial atención a los cambios en lara kiran y asegúrese de comunicarse con lara médico si: 
  · Lara sangrado vaginal parece estar volviéndose más intenso.  
  · Tiene debby secreción vaginal nueva o peor.  
  · Se siente la nena, ansiosa o desesperanzada por más de unos pocos días.  
  · No mejora bladimir se esperaba. Dónde puede encontrar más información en inglés? David [place] a http://www.gray.com/ Leonila U008 en la búsqueda para aprender más acerca de \"Parto vaginal: Instrucciones de cuidado. \" Revisado: 8 octubre, 2020               Versión del contenido: 12.8 © 9930-3051 Healthwise, [employer] Las instrucciones de cuidado fueron adaptadas bajo licencia por [employer] (which disclaims liability or warranty for this information). Si usted tiene [place] afección médica o sobre estas instrucciones, siempre pregunte a lara profesional de kiran. [employer], [employer] niega toda garantía o responsabilidad por lara uso de esta información.

## 2021-05-03 NOTE — PROGRESS NOTES
Postpartum Note    Adi# [id number]  43 y.o. female status post  6 weeks prior presenting for postpartum visit. Patient doing well post-partum without significant complaint. Lochia: normal  Pain: controlled, no longer taking ibuprofen  Baby: doing well, has seen PCP  Sexual activity: not yet  Plan for contraception: Would like the IUD  Symptoms of depression: doing well, no feelings of harming herself or baby, [place] score: 4  Breast/bottle: both  Support from FOB/family: Yes    Vitals:    Visit Vitals  /69 (BP 1 Location: Left upper arm, BP Patient Position: Sitting)   Pulse 82   Temp 97.1 °F (36.2 °C) (Temporal)   Resp 18   Ht 5' (1.524 m)   Wt 208 lb (94.3 kg)   SpO2 98%   BMI 40.62 kg/m²       Exam:  Patient without distress. Abdomen soft, fundus firm at level of umbilicus, nontender. Lower extremities:  No edema. No palpable cords or tenderness. Assessment and Plan:    Shahana Esquivel is a 43 y.o. [id number] s/p  at 39 weeks 1 days. Patient was induced for Adelina Dung, during pregnancy she was treated with NPH 10U/16U. Patient having uncomplicated post-partum course. 1. Continue routine care  2. Call clinic or make appointment for symptoms of sadness  3. Follow up for yearly well woman exam   4. 2hr PP GTT today  5.  Would like IUD for contraception, form filled out today                 Lauren Alvares DO

## 2021-05-04 LAB
GLUCOSE P FAST SERPL-MCNC: 96 MG/DL (ref 65–100)
GLUCOSE TOLERANCE,GTA: NORMAL
GLUCOSE, 2 HR,GTT2: 104 MG/DL (ref 65–140)

## 2021-06-08 ENCOUNTER — TELEPHONE (OUTPATIENT)
Dept: FAMILY MEDICINE CLINIC | Age: 36
End: 2021-06-08

## 2022-03-19 PROBLEM — R82.71 GBS BACTERIURIA: Status: ACTIVE | Noted: 2021-01-06

## 2022-03-19 PROBLEM — Z34.90 PREGNANCY: Status: ACTIVE | Noted: 2021-03-23

## 2022-03-20 PROBLEM — O24.419 GDM, CLASS A2: Status: ACTIVE | Noted: 2021-02-08

## 2023-05-14 RX ORDER — IBUPROFEN 800 MG/1
800 TABLET ORAL
COMMUNITY
Start: 2021-03-23

## 2023-05-14 RX ORDER — PSEUDOEPHEDRINE HCL 30 MG
100 TABLET ORAL 2 TIMES DAILY PRN
COMMUNITY
Start: 2021-03-23

## 2025-03-24 NOTE — PROGRESS NOTES
I reviewed with the resident the medical history and the resident's findings on the physical examination. I discussed with the resident the patient's diagnosis and concur with the plan. Is checking one hour instead of two hours PP - values are fasting 115-154, and -240. 36yo [id number] @ 32w0d by 29wk scan   1. IUP: RH pos, GTT today, do not repeat GBS testing   2. Late to care: will defer dating to MFM, check UDS   3. Obesity: counseled on appropriate weight gain, measuring large, consider additional growth scan   4. AMA   5. Extra digit seen on baby: NIPT pending   6. Rubella NI: offer vaccine PP   7. GBS bacteriuria: abx at delivery   8. GDM: will start insulin - about 3/4 of weight based since insulin naive but can't afford short acting insulin - NPH 30/10. F/up in 1 week. Discussed risks of uncontrolled glucose including but not limited to macrosomia, birth injury, increased need for , birth defects, miscarriage/fetal demise,  hypoglycemia, persistent diabetes after pregnancy. Will need growth scan.       Referred to [facility] Patient's HM shows they are overdue for Colorectal Screening.   Care Everywhere and  files searched.   updated with 1/20/22 partial Colonoscopy.